# Patient Record
Sex: MALE | Race: WHITE | Employment: FULL TIME | URBAN - METROPOLITAN AREA
[De-identification: names, ages, dates, MRNs, and addresses within clinical notes are randomized per-mention and may not be internally consistent; named-entity substitution may affect disease eponyms.]

---

## 2018-12-06 ENCOUNTER — OFFICE VISIT (OUTPATIENT)
Dept: FAMILY MEDICINE CLINIC | Age: 33
End: 2018-12-06
Payer: COMMERCIAL

## 2018-12-06 VITALS
DIASTOLIC BLOOD PRESSURE: 72 MMHG | TEMPERATURE: 97.9 F | SYSTOLIC BLOOD PRESSURE: 118 MMHG | BODY MASS INDEX: 26.62 KG/M2 | HEART RATE: 86 BPM | HEIGHT: 67 IN | RESPIRATION RATE: 16 BRPM | OXYGEN SATURATION: 98 % | WEIGHT: 169.6 LBS

## 2018-12-06 DIAGNOSIS — F43.9 STRESS AT HOME: Primary | ICD-10-CM

## 2018-12-06 DIAGNOSIS — Z13.220 SCREENING, LIPID: ICD-10-CM

## 2018-12-06 DIAGNOSIS — J30.9 ALLERGIC RHINITIS, UNSPECIFIED SEASONALITY, UNSPECIFIED TRIGGER: ICD-10-CM

## 2018-12-06 DIAGNOSIS — G43.909 MIGRAINE WITHOUT STATUS MIGRAINOSUS, NOT INTRACTABLE, UNSPECIFIED MIGRAINE TYPE: ICD-10-CM

## 2018-12-06 PROCEDURE — 1036F TOBACCO NON-USER: CPT | Performed by: FAMILY MEDICINE

## 2018-12-06 PROCEDURE — G8484 FLU IMMUNIZE NO ADMIN: HCPCS | Performed by: FAMILY MEDICINE

## 2018-12-06 PROCEDURE — 99203 OFFICE O/P NEW LOW 30 MIN: CPT | Performed by: FAMILY MEDICINE

## 2018-12-06 PROCEDURE — G8419 CALC BMI OUT NRM PARAM NOF/U: HCPCS | Performed by: FAMILY MEDICINE

## 2018-12-06 PROCEDURE — G8427 DOCREV CUR MEDS BY ELIG CLIN: HCPCS | Performed by: FAMILY MEDICINE

## 2018-12-06 RX ORDER — MONTELUKAST SODIUM 10 MG/1
10 TABLET ORAL DAILY
Qty: 90 TABLET | Refills: 3 | Status: SHIPPED | OUTPATIENT
Start: 2018-12-06 | End: 2020-01-16 | Stop reason: SDUPTHER

## 2018-12-06 RX ORDER — BUPROPION HYDROCHLORIDE 150 MG/1
150 TABLET ORAL EVERY MORNING
Qty: 30 TABLET | Refills: 1 | Status: SHIPPED | OUTPATIENT
Start: 2018-12-06 | End: 2020-01-13

## 2018-12-06 RX ORDER — MONTELUKAST SODIUM 10 MG/1
10 TABLET ORAL DAILY
COMMUNITY
End: 2018-12-06 | Stop reason: SDUPTHER

## 2018-12-06 ASSESSMENT — ENCOUNTER SYMPTOMS
SORE THROAT: 0
VOICE CHANGE: 0
PHOTOPHOBIA: 0
ABDOMINAL PAIN: 0
VOMITING: 0
CONSTIPATION: 0
TROUBLE SWALLOWING: 0
WHEEZING: 0
NAUSEA: 0
COUGH: 0
SINUS PRESSURE: 0
SHORTNESS OF BREATH: 0
BACK PAIN: 0
BLOOD IN STOOL: 0
CHEST TIGHTNESS: 0
DIARRHEA: 0

## 2018-12-06 ASSESSMENT — PATIENT HEALTH QUESTIONNAIRE - PHQ9
2. FEELING DOWN, DEPRESSED OR HOPELESS: 0
SUM OF ALL RESPONSES TO PHQ9 QUESTIONS 1 & 2: 0
SUM OF ALL RESPONSES TO PHQ QUESTIONS 1-9: 0
1. LITTLE INTEREST OR PLEASURE IN DOING THINGS: 0
SUM OF ALL RESPONSES TO PHQ QUESTIONS 1-9: 0

## 2018-12-06 NOTE — PROGRESS NOTES
visit.        There are no associated abnormal neurological symptoms such as TIA's, loss of balance, loss of vision or speech, numbness or weakness on review. Past neurological history: negative for stroke, MS, epilepsy, or brain tumor. Feeling well otherwise, no complaints. Labs reviewed. Taking medications as prescribed. Patient's past medical, surgical, social and/or family history reviewed, updated in chart, and are non-contributory (unless otherwise stated). Medications and allergies also reviewed and updated in chart. Review of Systems   Constitutional: Negative for activity change, appetite change, chills, diaphoresis, fatigue, fever and unexpected weight change. HENT: Positive for congestion. Negative for hearing loss, sinus pressure, sore throat, tinnitus, trouble swallowing and voice change. Eyes: Negative for photophobia and visual disturbance. Respiratory: Negative for cough, chest tightness, shortness of breath and wheezing. Cardiovascular: Negative for chest pain, palpitations and leg swelling. Gastrointestinal: Negative for abdominal pain, blood in stool, constipation, diarrhea, nausea and vomiting. Endocrine: Negative for cold intolerance, heat intolerance, polydipsia, polyphagia and polyuria. Genitourinary: Negative for difficulty urinating, frequency, hematuria and urgency. Musculoskeletal: Negative for back pain, joint swelling and myalgias. Allergic/Immunologic: Negative for environmental allergies, food allergies and immunocompromised state. Neurological: Negative for dizziness, weakness, numbness and headaches. Hematological: Negative for adenopathy. Does not bruise/bleed easily. Psychiatric/Behavioral: Negative for agitation, behavioral problems, confusion, decreased concentration and sleep disturbance. The patient is not nervous/anxious. All other systems reviewed and are negative.       Physical Exam  Temp Readings from Last 3 Encounters:

## 2019-01-03 ENCOUNTER — HOSPITAL ENCOUNTER (OUTPATIENT)
Age: 34
Discharge: HOME | End: 2019-01-03
Payer: COMMERCIAL

## 2019-01-03 DIAGNOSIS — J30.9 ALLERGIC RHINITIS, UNSPECIFIED SEASONALITY, UNSPECIFIED TRIGGER: ICD-10-CM

## 2019-01-03 DIAGNOSIS — Z13.220 SCREENING, LIPID: ICD-10-CM

## 2019-01-03 DIAGNOSIS — G43.909 MIGRAINE WITHOUT STATUS MIGRAINOSUS, NOT INTRACTABLE, UNSPECIFIED MIGRAINE TYPE: ICD-10-CM

## 2019-01-03 LAB
ALBUMIN SERPL-MCNC: 4.8 G/DL (ref 3.5–5.2)
ALP BLD-CCNC: 46 U/L (ref 40–129)
ALT SERPL-CCNC: 20 U/L (ref 0–40)
ANION GAP SERPL CALCULATED.3IONS-SCNC: 11 MMOL/L (ref 7–16)
AST SERPL-CCNC: 16 U/L (ref 0–39)
BILIRUB SERPL-MCNC: 1.1 MG/DL (ref 0–1.2)
BUN BLDV-MCNC: 17 MG/DL (ref 6–20)
CALCIUM SERPL-MCNC: 9.6 MG/DL (ref 8.6–10.2)
CHLORIDE BLD-SCNC: 101 MMOL/L (ref 98–107)
CHOLESTEROL, TOTAL: 149 MG/DL (ref 0–199)
CO2: 29 MMOL/L (ref 22–29)
CREAT SERPL-MCNC: 1.1 MG/DL (ref 0.7–1.2)
GFR AFRICAN AMERICAN: >60
GFR NON-AFRICAN AMERICAN: >60 ML/MIN/1.73
GLUCOSE BLD-MCNC: 110 MG/DL (ref 74–99)
HCT VFR BLD CALC: 44.1 % (ref 37–54)
HDLC SERPL-MCNC: 58 MG/DL
HEMOGLOBIN: 14.8 G/DL (ref 12.5–16.5)
LDL CHOLESTEROL CALCULATED: 77 MG/DL (ref 0–99)
MCH RBC QN AUTO: 30.2 PG (ref 26–35)
MCHC RBC AUTO-ENTMCNC: 33.6 % (ref 32–34.5)
MCV RBC AUTO: 90 FL (ref 80–99.9)
PDW BLD-RTO: 12.6 FL (ref 11.5–15)
PLATELET # BLD: 213 E9/L (ref 130–450)
PMV BLD AUTO: 10.2 FL (ref 7–12)
POTASSIUM SERPL-SCNC: 4 MMOL/L (ref 3.5–5)
RBC # BLD: 4.9 E12/L (ref 3.8–5.8)
SODIUM BLD-SCNC: 141 MMOL/L (ref 132–146)
TOTAL PROTEIN: 6.9 G/DL (ref 6.4–8.3)
TRIGL SERPL-MCNC: 68 MG/DL (ref 0–149)
TSH SERPL DL<=0.05 MIU/L-ACNC: 2.29 UIU/ML (ref 0.27–4.2)
VLDLC SERPL CALC-MCNC: 14 MG/DL
WBC # BLD: 4.9 E9/L (ref 4.5–11.5)

## 2019-01-03 PROCEDURE — 36415 COLL VENOUS BLD VENIPUNCTURE: CPT

## 2019-01-03 PROCEDURE — 85027 COMPLETE CBC AUTOMATED: CPT

## 2019-01-03 PROCEDURE — 84443 ASSAY THYROID STIM HORMONE: CPT

## 2019-01-03 PROCEDURE — 80053 COMPREHEN METABOLIC PANEL: CPT

## 2019-01-03 PROCEDURE — 80061 LIPID PANEL: CPT

## 2019-01-09 ENCOUNTER — OFFICE VISIT (OUTPATIENT)
Dept: FAMILY MEDICINE CLINIC | Age: 34
End: 2019-01-09
Payer: COMMERCIAL

## 2019-01-09 VITALS
BODY MASS INDEX: 26.37 KG/M2 | SYSTOLIC BLOOD PRESSURE: 110 MMHG | DIASTOLIC BLOOD PRESSURE: 72 MMHG | OXYGEN SATURATION: 98 % | HEART RATE: 80 BPM | WEIGHT: 168 LBS | TEMPERATURE: 98.5 F | RESPIRATION RATE: 16 BRPM | HEIGHT: 67 IN

## 2019-01-09 DIAGNOSIS — G43.909 MIGRAINE WITHOUT STATUS MIGRAINOSUS, NOT INTRACTABLE, UNSPECIFIED MIGRAINE TYPE: Primary | ICD-10-CM

## 2019-01-09 PROCEDURE — G8427 DOCREV CUR MEDS BY ELIG CLIN: HCPCS | Performed by: FAMILY MEDICINE

## 2019-01-09 PROCEDURE — 99213 OFFICE O/P EST LOW 20 MIN: CPT | Performed by: FAMILY MEDICINE

## 2019-01-09 PROCEDURE — G8484 FLU IMMUNIZE NO ADMIN: HCPCS | Performed by: FAMILY MEDICINE

## 2019-01-09 PROCEDURE — 1036F TOBACCO NON-USER: CPT | Performed by: FAMILY MEDICINE

## 2019-01-09 PROCEDURE — G8419 CALC BMI OUT NRM PARAM NOF/U: HCPCS | Performed by: FAMILY MEDICINE

## 2019-01-09 RX ORDER — SUMATRIPTAN 25 MG/1
TABLET, FILM COATED ORAL
Qty: 9 TABLET | Refills: 5 | Status: SHIPPED
Start: 2019-01-09 | End: 2020-12-17

## 2019-01-09 ASSESSMENT — ENCOUNTER SYMPTOMS
SHORTNESS OF BREATH: 0
BLOOD IN STOOL: 0
SINUS PRESSURE: 0
NAUSEA: 0
COUGH: 0
SORE THROAT: 0
WHEEZING: 0
TROUBLE SWALLOWING: 0
VOICE CHANGE: 0
CHEST TIGHTNESS: 0
ABDOMINAL PAIN: 0
PHOTOPHOBIA: 0
VOMITING: 0
DIARRHEA: 0
CONSTIPATION: 0
BACK PAIN: 0

## 2019-07-09 ENCOUNTER — OFFICE VISIT (OUTPATIENT)
Dept: FAMILY MEDICINE CLINIC | Age: 34
End: 2019-07-09
Payer: COMMERCIAL

## 2019-07-09 VITALS
RESPIRATION RATE: 16 BRPM | HEIGHT: 67 IN | SYSTOLIC BLOOD PRESSURE: 122 MMHG | DIASTOLIC BLOOD PRESSURE: 80 MMHG | WEIGHT: 164 LBS | HEART RATE: 92 BPM | BODY MASS INDEX: 25.74 KG/M2 | OXYGEN SATURATION: 100 % | TEMPERATURE: 98.6 F

## 2019-07-09 DIAGNOSIS — R10.31 RIGHT GROIN PAIN: Primary | ICD-10-CM

## 2019-07-09 DIAGNOSIS — R19.7 DIARRHEA, UNSPECIFIED TYPE: ICD-10-CM

## 2019-07-09 PROCEDURE — G8419 CALC BMI OUT NRM PARAM NOF/U: HCPCS | Performed by: FAMILY MEDICINE

## 2019-07-09 PROCEDURE — 1036F TOBACCO NON-USER: CPT | Performed by: FAMILY MEDICINE

## 2019-07-09 PROCEDURE — 99214 OFFICE O/P EST MOD 30 MIN: CPT | Performed by: FAMILY MEDICINE

## 2019-07-09 PROCEDURE — G8427 DOCREV CUR MEDS BY ELIG CLIN: HCPCS | Performed by: FAMILY MEDICINE

## 2019-07-09 ASSESSMENT — PATIENT HEALTH QUESTIONNAIRE - PHQ9
1. LITTLE INTEREST OR PLEASURE IN DOING THINGS: 0
SUM OF ALL RESPONSES TO PHQ QUESTIONS 1-9: 0
2. FEELING DOWN, DEPRESSED OR HOPELESS: 0
SUM OF ALL RESPONSES TO PHQ9 QUESTIONS 1 & 2: 0
SUM OF ALL RESPONSES TO PHQ QUESTIONS 1-9: 0

## 2019-07-09 NOTE — PROGRESS NOTES
7/9/2019    Chief Complaint   Patient presents with    Hernia     loading a helium tank now has testicle pain x 6 days        HPI    Wanda Gray is a 36 y.o. patient that presents today for:    Inguinal Hernia: Patient presents for evaluation of right right inguinal hernia. Symptoms were first noted several days ago. Symptoms did not start at work. Pain is sharp. Lump is not present. Pt has no symptoms of  chronic constipation, chronic cough, difficulty urinating. Pt. does not have previous history of groin surgery. Diarrhea:    Patient is here today with complaints of diarrhea. This is a/an chronic problem and has been going on for many weeks . He is having 4-5 stools per day. Exacerbating factors include certain foods. Alleviating factors include none. Associated signs and symptoms include Gastrointestinal ROS: no abdominal pain, change in bowel habits, or black or bloody stools. Patient does not have a change in appetite. Patient is  drinking well. He does not have signs or symptoms of dehydration. Patient does not admit to recent antibiotic usage  Recent travel includes none. Patient does not have blood in stool. Patient has not had a colonoscopy. Sick contacts include none. Rupal complains of heartburn. This has been associated with diarrhea after eating. Symptoms have been present for several months. He denies dysphagia. He has not lost weight. He denies melena, hematochezia, hematemesis, and coffee ground emesis. Medical therapy in the past has included none. Patient's past medical, surgical, social and/or family history reviewed, updated in chart, and are non-contributory (unless otherwise stated). Medications and allergies also reviewed and updated in chart.      ROS Unless otherwise specified  Review of Systems - General ROS: negative for - chills, fatigue, fever, night sweats, sleep disturbance, weight gain or weight loss  Psychological ROS: negative for - anxiety, pulses 2+ bilateral.  PT/DP pulse 2+ bilat. No C/C/E    Chest: clear to auscultation, no wheezes, rales or rhonchi, symmetric air entry. Abdomen: Soft, non-tender, non-distended, positive BS in all 4 quadrants, no testicular masses, tender to palpation at inguinal ring. Extremities:Dorsalis pedis pulses palpated bilaterally, no clubbing, cyanosis, edema or erythema     SKIN: no lesions, jaundice, petechiae, pallor, cyanosis, ecchymosis    NEURO: gross motor exam normal by observation, gait normal    Mental status - alert, oriented to person, place, and time, normal mood, behavior, speech, dress, motor activity, and thought processes      Assessment/Plan  Lisa Powers was seen today for hernia. Diagnoses and all orders for this visit:    Right groin pain  -     Stan Mojica MD, General Surgery, [place]    Diarrhea, unspecified type  -     Stan Mojica MD, General Surgery, [place]          Return if symptoms worsen or fail to improve. Linette Krishnamurthy, DO    Call or go to ED immediately if symptoms worsen or persist.    Educational materials and/or home exercises printed for patient's review and were included in patient instructions on his/her After Visit Summary and given to patient at the end of visit. Counseled regarding above diagnosis, including possible risks and complications,  especially if left uncontrolled. Counseled regarding the possible side effects, risks, benefits and alternatives to treatment; patient and/or guardian verbalizes understanding, agrees, feels comfortable with and wishes to proceed with above treatment plan. Advised patient to call with any new medication issues, and read all Rx info from pharmacy to assure aware of all possible risks and side effects of medication before taking. Reviewed age and gender appropriate health screening exams and vaccinations.   Advised patient regarding importance of keeping up with recommended health maintenance and to schedule as soon as possible if overdue, as this is important in assessing for undiagnosed pathology, especially cancer, as well as protecting against potentially harmful/life threatening disease. Patient and/or guardian verbalizes understanding and agrees with above counseling, assessment and plan. All questions answered.

## 2019-08-31 ENCOUNTER — OFFICE VISIT (OUTPATIENT)
Dept: FAMILY MEDICINE CLINIC | Age: 34
End: 2019-08-31
Payer: COMMERCIAL

## 2019-08-31 VITALS
DIASTOLIC BLOOD PRESSURE: 70 MMHG | SYSTOLIC BLOOD PRESSURE: 112 MMHG | WEIGHT: 164 LBS | HEART RATE: 84 BPM | TEMPERATURE: 98 F | BODY MASS INDEX: 25.69 KG/M2 | OXYGEN SATURATION: 99 %

## 2019-08-31 DIAGNOSIS — H10.31 ACUTE CONJUNCTIVITIS OF RIGHT EYE, UNSPECIFIED ACUTE CONJUNCTIVITIS TYPE: Primary | ICD-10-CM

## 2019-08-31 PROCEDURE — 99213 OFFICE O/P EST LOW 20 MIN: CPT | Performed by: FAMILY MEDICINE

## 2019-08-31 RX ORDER — TOBRAMYCIN 3 MG/ML
1 SOLUTION/ DROPS OPHTHALMIC EVERY 6 HOURS
Qty: 5 ML | Refills: 0 | Status: SHIPPED | OUTPATIENT
Start: 2019-08-31 | End: 2019-09-05

## 2019-08-31 ASSESSMENT — ENCOUNTER SYMPTOMS
DIARRHEA: 0
EYE REDNESS: 1
EYE ITCHING: 0
CHEST TIGHTNESS: 0
BLOOD IN STOOL: 0
ALLERGIC/IMMUNOLOGIC NEGATIVE: 1
NAUSEA: 0
PHOTOPHOBIA: 0
COUGH: 0
ABDOMINAL PAIN: 0
SHORTNESS OF BREATH: 0
VOMITING: 0
SORE THROAT: 0
EYE DISCHARGE: 0
BACK PAIN: 0
SINUS PAIN: 0
TROUBLE SWALLOWING: 0
EYE PAIN: 1

## 2019-08-31 NOTE — PROGRESS NOTES
8/31/2019), Disp: 30 tablet, Rfl: 1    montelukast (SINGULAIR) 10 MG tablet, Take 1 tablet by mouth daily, Disp: 90 tablet, Rfl: 3  Allergies   Allergen Reactions    Cephalosporins     Morphine      Violent         Past Medical History:   Diagnosis Date    Allergic rhinitis     Lactose intolerance      Past Surgical History:   Procedure Laterality Date    ANTERIOR CRUCIATE LIGAMENT REPAIR      ECHOCARDIOGRAM EXERCISE STRESS TEST  11/6/2013         TONSILLECTOMY       Family History   Problem Relation Age of Onset    Diabetes Mother     Other Father         pituitary tumor    Ovarian Cancer Maternal Grandmother     Cancer Maternal Grandfather     Lung Cancer Paternal Grandfather      Social History     Socioeconomic History    Marital status:      Spouse name: Not on file    Number of children: Not on file    Years of education: Not on file    Highest education level: Not on file   Occupational History    Occupation: Wastewater treatment   Social Needs    Financial resource strain: Not on file    Food insecurity:     Worry: Not on file     Inability: Not on file    Transportation needs:     Medical: Not on file     Non-medical: Not on file   Tobacco Use    Smoking status: Never Smoker    Smokeless tobacco: Never Used   Substance and Sexual Activity    Alcohol use:  Yes     Alcohol/week: 4.0 standard drinks     Types: 4 Cans of beer per week     Comment: daily    Drug use: No    Sexual activity: Yes     Partners: Female   Lifestyle    Physical activity:     Days per week: Not on file     Minutes per session: Not on file    Stress: Not on file   Relationships    Social connections:     Talks on phone: Not on file     Gets together: Not on file     Attends Restoration service: Not on file     Active member of club or organization: Not on file     Attends meetings of clubs or organizations: Not on file     Relationship status: Not on file    Intimate partner violence:     Fear of current or ex partner: Not on file     Emotionally abused: Not on file     Physically abused: Not on file     Forced sexual activity: Not on file   Other Topics Concern    Not on file   Social History Narrative    Not on file       Vitals:    08/31/19 0809   BP: 112/70   Pulse: 84   Temp: 98 °F (36.7 °C)   SpO2: 99%   Weight: 164 lb (74.4 kg)       Physical Exam   Constitutional: He is oriented to person, place, and time. He appears well-developed and well-nourished. HENT:   Head: Atraumatic. Right Ear: External ear normal.   Left Ear: External ear normal.   Nose: Nose normal.   Mouth/Throat: Oropharynx is clear and moist. No oropharyngeal exudate. Eyes: Pupils are equal, round, and reactive to light. EOM and lids are normal. Lids are everted and swept, no foreign bodies found. Right conjunctiva is injected. Neck: Normal range of motion. Neck supple. No tracheal deviation present. No thyromegaly present. Cardiovascular: Normal rate, regular rhythm and intact distal pulses. Exam reveals no gallop and no friction rub. No murmur heard. Pulmonary/Chest: Effort normal and breath sounds normal. No respiratory distress. Abdominal: Soft. Bowel sounds are normal.   Musculoskeletal: Normal range of motion. He exhibits no edema, tenderness or deformity. Lymphadenopathy:     He has no cervical adenopathy. Neurological: He is alert and oriented to person, place, and time. He displays normal reflexes. No sensory deficit. He exhibits normal muscle tone. Coordination normal.   Skin: Skin is warm and dry. Capillary refill takes less than 2 seconds. No rash noted. Psychiatric: He has a normal mood and affect. Assessment and Plan:  Basilia Montalvo was seen today for eye pain.     Diagnoses and all orders for this visit:    Acute conjunctivitis of right eye, unspecified acute conjunctivitis type  -     tobramycin (TOBREX) 0.3 % ophthalmic solution; Place 1 drop into the left eye every 6 hours for 5 days    Fluorescein,

## 2019-12-30 ENCOUNTER — PATIENT MESSAGE (OUTPATIENT)
Dept: FAMILY MEDICINE CLINIC | Age: 34
End: 2019-12-30

## 2019-12-30 NOTE — TELEPHONE ENCOUNTER
From: Shea Alfaro  To: Celina Rachel DO  Sent: 12/30/2019 2:33 PM EST  Subject: Non-Urgent Jewel Wheeler,    I have an appointment coming up in January and I was wondering if you wanted blood work before?      Thanks  [facility]

## 2020-01-09 ENCOUNTER — HOSPITAL ENCOUNTER (OUTPATIENT)
Age: 35
Discharge: HOME | End: 2020-01-09
Payer: COMMERCIAL

## 2020-01-09 LAB
ALBUMIN SERPL-MCNC: 4.9 G/DL (ref 3.5–5.2)
ALP BLD-CCNC: 52 U/L (ref 40–129)
ALT SERPL-CCNC: 31 U/L (ref 0–40)
ANION GAP SERPL CALCULATED.3IONS-SCNC: 11 MMOL/L (ref 7–16)
AST SERPL-CCNC: 19 U/L (ref 0–39)
BILIRUB SERPL-MCNC: 1.4 MG/DL (ref 0–1.2)
BUN BLDV-MCNC: 13 MG/DL (ref 6–20)
CALCIUM SERPL-MCNC: 9.9 MG/DL (ref 8.6–10.2)
CHLORIDE BLD-SCNC: 97 MMOL/L (ref 98–107)
CHOLESTEROL, TOTAL: 149 MG/DL (ref 0–199)
CO2: 30 MMOL/L (ref 22–29)
CREAT SERPL-MCNC: 1.1 MG/DL (ref 0.7–1.2)
GFR AFRICAN AMERICAN: >60
GFR NON-AFRICAN AMERICAN: >60 ML/MIN/1.73
GLUCOSE BLD-MCNC: 108 MG/DL (ref 74–99)
HCT VFR BLD CALC: 46.8 % (ref 37–54)
HDLC SERPL-MCNC: 49 MG/DL
HEMOGLOBIN: 15.5 G/DL (ref 12.5–16.5)
LDL CHOLESTEROL CALCULATED: 79 MG/DL (ref 0–99)
MCH RBC QN AUTO: 29.9 PG (ref 26–35)
MCHC RBC AUTO-ENTMCNC: 33.1 % (ref 32–34.5)
MCV RBC AUTO: 90.3 FL (ref 80–99.9)
PDW BLD-RTO: 11.9 FL (ref 11.5–15)
PLATELET # BLD: 235 E9/L (ref 130–450)
PMV BLD AUTO: 9.8 FL (ref 7–12)
POTASSIUM SERPL-SCNC: 4.3 MMOL/L (ref 3.5–5)
RBC # BLD: 5.18 E12/L (ref 3.8–5.8)
SODIUM BLD-SCNC: 138 MMOL/L (ref 132–146)
TOTAL PROTEIN: 7.5 G/DL (ref 6.4–8.3)
TRIGL SERPL-MCNC: 104 MG/DL (ref 0–149)
TSH SERPL DL<=0.05 MIU/L-ACNC: 2.89 UIU/ML (ref 0.27–4.2)
VLDLC SERPL CALC-MCNC: 21 MG/DL
WBC # BLD: 7.1 E9/L (ref 4.5–11.5)

## 2020-01-09 PROCEDURE — 36415 COLL VENOUS BLD VENIPUNCTURE: CPT

## 2020-01-09 PROCEDURE — 80053 COMPREHEN METABOLIC PANEL: CPT

## 2020-01-09 PROCEDURE — 84443 ASSAY THYROID STIM HORMONE: CPT

## 2020-01-09 PROCEDURE — 80061 LIPID PANEL: CPT

## 2020-01-09 PROCEDURE — 85027 COMPLETE CBC AUTOMATED: CPT

## 2020-01-13 ENCOUNTER — OFFICE VISIT (OUTPATIENT)
Dept: FAMILY MEDICINE CLINIC | Age: 35
End: 2020-01-13
Payer: COMMERCIAL

## 2020-01-13 VITALS
WEIGHT: 177.4 LBS | SYSTOLIC BLOOD PRESSURE: 126 MMHG | HEART RATE: 77 BPM | DIASTOLIC BLOOD PRESSURE: 82 MMHG | TEMPERATURE: 98 F | BODY MASS INDEX: 27.78 KG/M2 | RESPIRATION RATE: 16 BRPM | OXYGEN SATURATION: 99 %

## 2020-01-13 LAB — HBA1C MFR BLD: 5.2 %

## 2020-01-13 PROCEDURE — 1036F TOBACCO NON-USER: CPT | Performed by: FAMILY MEDICINE

## 2020-01-13 PROCEDURE — G8484 FLU IMMUNIZE NO ADMIN: HCPCS | Performed by: FAMILY MEDICINE

## 2020-01-13 PROCEDURE — 99213 OFFICE O/P EST LOW 20 MIN: CPT | Performed by: FAMILY MEDICINE

## 2020-01-13 PROCEDURE — G8419 CALC BMI OUT NRM PARAM NOF/U: HCPCS | Performed by: FAMILY MEDICINE

## 2020-01-13 PROCEDURE — G8427 DOCREV CUR MEDS BY ELIG CLIN: HCPCS | Performed by: FAMILY MEDICINE

## 2020-01-13 PROCEDURE — 83036 HEMOGLOBIN GLYCOSYLATED A1C: CPT | Performed by: FAMILY MEDICINE

## 2020-01-13 ASSESSMENT — PATIENT HEALTH QUESTIONNAIRE - PHQ9
SUM OF ALL RESPONSES TO PHQ QUESTIONS 1-9: 0
SUM OF ALL RESPONSES TO PHQ9 QUESTIONS 1 & 2: 0
SUM OF ALL RESPONSES TO PHQ QUESTIONS 1-9: 0
1. LITTLE INTEREST OR PLEASURE IN DOING THINGS: 0
2. FEELING DOWN, DEPRESSED OR HOPELESS: 0

## 2020-01-16 RX ORDER — MONTELUKAST SODIUM 10 MG/1
10 TABLET ORAL DAILY
Qty: 90 TABLET | Refills: 3 | Status: SHIPPED
Start: 2020-01-16 | End: 2021-01-18 | Stop reason: SDUPTHER

## 2020-06-15 ENCOUNTER — OFFICE VISIT (OUTPATIENT)
Dept: FAMILY MEDICINE CLINIC | Age: 35
End: 2020-06-15
Payer: COMMERCIAL

## 2020-06-15 VITALS
SYSTOLIC BLOOD PRESSURE: 124 MMHG | WEIGHT: 178 LBS | BODY MASS INDEX: 27.94 KG/M2 | RESPIRATION RATE: 16 BRPM | HEART RATE: 81 BPM | TEMPERATURE: 98.6 F | OXYGEN SATURATION: 98 % | DIASTOLIC BLOOD PRESSURE: 84 MMHG | HEIGHT: 67 IN

## 2020-06-15 PROCEDURE — 1036F TOBACCO NON-USER: CPT | Performed by: FAMILY MEDICINE

## 2020-06-15 PROCEDURE — 99214 OFFICE O/P EST MOD 30 MIN: CPT | Performed by: FAMILY MEDICINE

## 2020-06-15 PROCEDURE — G8427 DOCREV CUR MEDS BY ELIG CLIN: HCPCS | Performed by: FAMILY MEDICINE

## 2020-06-15 PROCEDURE — G8419 CALC BMI OUT NRM PARAM NOF/U: HCPCS | Performed by: FAMILY MEDICINE

## 2020-06-15 RX ORDER — CYCLOBENZAPRINE HCL 5 MG
5 TABLET ORAL NIGHTLY PRN
Qty: 30 TABLET | Refills: 0 | Status: SHIPPED
Start: 2020-06-15 | End: 2020-12-17

## 2020-06-15 NOTE — PROGRESS NOTES
6/16/2020    Chief Complaint   Patient presents with    Hip Pain     right hip pain x 3 months no injury    Skin Problem     left calf gets a hot burning sensation but not hot to touch x 1 week       HPI    Danie Tracey is a 30 y.o. patient that presents today for low back pain. Hip Pain: Patient complains of right hip pain. Onset of the symptoms was several months ago. Inciting event: none. Current symptoms include is worse with weight bearing. Associated symptoms: none. Aggravating symptoms: any weight bearing. Patient's overall course: stable. Patient has had no prior hip problems. Previous visits for this problem: none. Evaluation to date: none. Treatment to date: none. Has not taken any otc meds. Sleep Apnea: Patient presents with possible obstructive sleep apnea. Patent has a several year history of symptoms of daytime fatigue. Patient generally gets 5 or 6 hours of sleep per night, and states they generally have generally restful sleep. Snoring of moderate severity is present. Apneic episodes is present. Nasal obstruction is not present. Patient has had tonsillectomy due to MONALISA. Diagnosed as a child. Does have a CPAP. Patient's past medical, surgical, social and/or family history reviewed, updated in chart, and are non-contributory (unless otherwise stated). Medications and allergies also reviewed and updated in chart.      ROS: Unless otherwise noted  Review of Systems - General ROS: negative for - chills, fatigue, fever, night sweats, sleep disturbance, weight gain or weight loss  Psychological ROS: negative for - anxiety, behavioral disorder, depression, hallucinations, irritability, memory difficulties, mood swings, sleep disturbances or suicidal ideation  ENT ROS: negative for - epistaxis, headaches, hearing change, nasal congestion, nasal discharge, nasal polyps, sinus pain, tinnitus, vertigo or visual changes  Hematological and Lymphatic ROS: negative for - bleeding obvious masses or deformities. Gait normal. LROM in extension/SLR bilaterally. Abdomen: Soft, non-tender, non-distended, positive BS in all 4 quadrants    Extremities:Dorsalis pedis pulses palpated bilaterally, no clubbing, cyanosis, edema or erythema     SKIN: no lesions, jaundice, petechiae, pallor, cyanosis, ecchymosis    NEURO: gross motor exam normal by observation, gait normal      Assessment/Plan  Jorje Rodriguez was seen today for hip pain and skin problem. Diagnoses and all orders for this visit:    Right hip pain  -     XR HIP RIGHT (2-3 VIEWS); Future  -     cyclobenzaprine (FLEXERIL) 5 MG tablet; Take 1 tablet by mouth nightly as needed for Muscle spasms    Obstructive sleep apnea syndrome  -     Baseline Diagnostic Sleep Study; Future    Lumbar radiculopathy  -     XR LUMBAR SPINE (MIN 4 VIEWS); Future          Return if symptoms worsen or fail to improve. Linette Krishnamurthy, DO    Call or go to ED immediately if symptoms worsen or persist.    Educational materials and/or home exercises printed for patient's review and were included in patient instructions on his/her After Visit Summary and given to patient at the end of visit. Counseled regarding above diagnosis, including possible risks and complications,  especially if left uncontrolled. Counseled regarding the possible side effects, risks, benefits and alternatives to treatment; patient and/or guardian verbalizes understanding, agrees, feels comfortable with and wishes to proceed with above treatment plan. Advised patient to call with any new medication issues, and read all Rx info from pharmacy to assure aware of all possible risks and side effects of medication before taking. Reviewed age and gender appropriate health screening exams and vaccinations.   Advised patient regarding importance of keeping up with recommended health maintenance and to schedule as soon as possible if overdue, as this is important in assessing for

## 2020-06-15 NOTE — PATIENT INSTRUCTIONS
Patient Education        Hip Bursitis: Exercises  Introduction  Here are some examples of exercises for you to try. The exercises may be suggested for a condition or for rehabilitation. Start each exercise slowly. Ease off the exercises if you start to have pain. You will be told when to start these exercises and which ones will work best for you. How to do the exercises  Hip rotator stretch   1. Lie on your back with both knees bent and your feet flat on the floor. 2. Put the ankle of your affected leg on your opposite thigh near your knee. 3. Use your hand to gently push your knee away from your body until you feel a gentle stretch around your hip. 4. Hold the stretch for 15 to 30 seconds. 5. Repeat 2 to 4 times. 6. Repeat steps 1 through 5, but this time use your hand to gently pull your knee toward your opposite shoulder. Iliotibial band stretch   1. Lean sideways against a wall. If you are not steady on your feet, hold on to a chair or counter. 2. Stand on the leg with the affected hip, with that leg close to the wall. Then cross your other leg in front of it. 3. Let your affected hip drop out to the side of your body and against wall. Then lean away from your affected hip until you feel a stretch. 4. Hold the stretch for 15 to 30 seconds. 5. Repeat 2 to 4 times. Straight-leg raises to the outside   1. Lie on your side, with your affected hip on top. 2. Tighten the front thigh muscles of your top leg to keep your knee straight. 3. Keep your hip and your leg straight in line with the rest of your body, and keep your knee pointing forward. Do not drop your hip back. 4. Lift your top leg straight up toward the ceiling, about 12 inches off the floor. Hold for about 6 seconds, then slowly lower your leg. 5. Repeat 8 to 12 times. Clamshell   1. Lie on your side, with your affected hip on top and your head propped on a pillow. Keep your feet and knees together and your knees bent.   2. Raise your top knee, but keep your feet together. Do not let your hips roll back. Your legs should open up like a clamshell. 3. Hold for 6 seconds. 4. Slowly lower your knee back down. Rest for 10 seconds. 5. Repeat 8 to 12 times. Follow-up care is a key part of your treatment and safety. Be sure to make and go to all appointments, and call your doctor if you are having problems. It's also a good idea to know your test results and keep a list of the medicines you take. Where can you learn more? Go to https://SnaptalentpeAudioPixels.[employer]. org and sign in to your [employer] account. Enter Y119 in the [facility] box to learn more about \"Hip Bursitis: Exercises. \"     If you do not have an account, please click on the \"Sign Up Now\" link. Current as of: March 2, 2020               Content Version: 12.5  © 2006-2020 [employer]. Care instructions adapted under license by [facility] ([facility]). If you have questions about a medical condition or this instruction, always ask your healthcare professional. [place] [address] any warranty or liability for your use of this information. Patient Education        Low Back Pain: Exercises  Introduction  Here are some examples of exercises for you to try. The exercises may be suggested for a condition or for rehabilitation. Start each exercise slowly. Ease off the exercises if you start to have pain. You will be told when to start these exercises and which ones will work best for you. How to do the exercises  Press-up   7. Lie on your stomach, supporting your body with your forearms. 8. Press your elbows down into the floor to raise your upper back. As you do this, relax your stomach muscles and allow your back to arch without using your back muscles. As your press up, do not let your hips or pelvis come off the floor. 9. Hold for 15 to 30 seconds, then relax. 10. Repeat 2 to 4 times.     Alternate arm and leg (bird dog) exercise   Do tighten your muscles by pulling in and imagining your belly button moving toward your spine. You should feel like your back is pressing to the floor and your hips and pelvis are rocking back. 3. Hold for about 6 seconds while you breathe smoothly. 4. Repeat 8 to 12 times. Heel dig bridging   1. Lie on your back with both knees bent and your ankles bent so that only your heels are digging into the floor. Your knees should be bent about 90 degrees. 2. Then push your heels into the floor, squeeze your buttocks, and lift your hips off the floor until your shoulders, hips, and knees are all in a straight line. 3. Hold for about 6 seconds as you continue to breathe normally, and then slowly lower your hips back down to the floor and rest for up to 10 seconds. 4. Do 8 to 12 repetitions. Hamstring stretch in doorway   1. Lie on your back in a doorway, with one leg through the open door. 2. Slide your leg up the wall to straighten your knee. You should feel a gentle stretch down the back of your leg. 3. Hold the stretch for at least 15 to 30 seconds. Do not arch your back, point your toes, or bend either knee. Keep one heel touching the floor and the other heel touching the wall. 4. Repeat with your other leg. 5. Do 2 to 4 times for each leg. Hip flexor stretch   1. Kneel on the floor with one knee bent and one leg behind you. Place your forward knee over your foot. Keep your other knee touching the floor. 2. Slowly push your hips forward until you feel a stretch in the upper thigh of your rear leg. 3. Hold the stretch for at least 15 to 30 seconds. Repeat with your other leg. 4. Do 2 to 4 times on each side. Wall sit   1. Stand with your back 10 to 12 inches away from a wall. 2. Lean into the wall until your back is flat against it. 3. Slowly slide down until your knees are slightly bent, pressing your lower back into the wall. 4. Hold for about 6 seconds, then slide back up the wall.   5. Repeat 8 to 12 times. Follow-up care is a key part of your treatment and safety. Be sure to make and go to all appointments, and call your doctor if you are having problems. It's also a good idea to know your test results and keep a list of the medicines you take. Where can you learn more? Go to https://chpepiceweb.[employer]. org and sign in to your [employer] account. Enter O000 in the [employer] box to learn more about \"Low Back Pain: Exercises. \"     If you do not have an account, please click on the \"Sign Up Now\" link. Current as of: March 2, 2020               Content Version: 12.5  © 2006-2020 [employer]. Care instructions adapted under license by [facility] ([facility]). If you have questions about a medical condition or this instruction, always ask your healthcare professional. [address] any warranty or liability for your use of this information.

## 2020-07-23 ENCOUNTER — OFFICE VISIT (OUTPATIENT)
Dept: FAMILY MEDICINE CLINIC | Age: 35
End: 2020-07-23
Payer: COMMERCIAL

## 2020-07-23 VITALS
SYSTOLIC BLOOD PRESSURE: 118 MMHG | WEIGHT: 178 LBS | HEIGHT: 67 IN | OXYGEN SATURATION: 96 % | RESPIRATION RATE: 18 BRPM | BODY MASS INDEX: 27.94 KG/M2 | DIASTOLIC BLOOD PRESSURE: 80 MMHG | TEMPERATURE: 98 F | HEART RATE: 81 BPM

## 2020-07-23 PROCEDURE — G8419 CALC BMI OUT NRM PARAM NOF/U: HCPCS | Performed by: FAMILY MEDICINE

## 2020-07-23 PROCEDURE — 99213 OFFICE O/P EST LOW 20 MIN: CPT | Performed by: FAMILY MEDICINE

## 2020-07-23 PROCEDURE — 1036F TOBACCO NON-USER: CPT | Performed by: FAMILY MEDICINE

## 2020-07-23 PROCEDURE — G8427 DOCREV CUR MEDS BY ELIG CLIN: HCPCS | Performed by: FAMILY MEDICINE

## 2020-07-23 SDOH — ECONOMIC STABILITY: INCOME INSECURITY: HOW HARD IS IT FOR YOU TO PAY FOR THE VERY BASICS LIKE FOOD, HOUSING, MEDICAL CARE, AND HEATING?: NOT HARD AT ALL

## 2020-07-23 SDOH — ECONOMIC STABILITY: FOOD INSECURITY: WITHIN THE PAST 12 MONTHS, YOU WORRIED THAT YOUR FOOD WOULD RUN OUT BEFORE YOU GOT MONEY TO BUY MORE.: NEVER TRUE

## 2020-07-23 SDOH — ECONOMIC STABILITY: TRANSPORTATION INSECURITY
IN THE PAST 12 MONTHS, HAS LACK OF TRANSPORTATION KEPT YOU FROM MEETINGS, WORK, OR FROM GETTING THINGS NEEDED FOR DAILY LIVING?: NO

## 2020-07-23 SDOH — ECONOMIC STABILITY: TRANSPORTATION INSECURITY
IN THE PAST 12 MONTHS, HAS THE LACK OF TRANSPORTATION KEPT YOU FROM MEDICAL APPOINTMENTS OR FROM GETTING MEDICATIONS?: NO

## 2020-07-23 SDOH — ECONOMIC STABILITY: FOOD INSECURITY: WITHIN THE PAST 12 MONTHS, THE FOOD YOU BOUGHT JUST DIDN'T LAST AND YOU DIDN'T HAVE MONEY TO GET MORE.: NEVER TRUE

## 2020-07-23 NOTE — PROGRESS NOTES
7/23/2020    Chief Complaint   Patient presents with    Hip Pain     follow up right hip pain review x=rays       HPI    Frederica Hashimoto is a 30 y.o. patient that presents today for:      Impression    Mild joint space narrowing of the right hip.  No acute osseous abnormality. Patient's past medical, surgical, social and/or family history reviewed, updated in chart, and are non-contributory (unless otherwise stated). Medications and allergies also reviewed and updated in chart.      ROS Unless otherwise specified  Review of Systems - General ROS: negative for - chills, fatigue, fever, night sweats, sleep disturbance, weight gain or weight loss  Psychological ROS: negative for - anxiety, behavioral disorder, depression, hallucinations, irritability, memory difficulties, mood swings, sleep disturbances or suicidal ideation  ENT ROS: negative for - epistaxis, headaches, hearing change, nasal congestion, nasal discharge, nasal polyps, sinus pain, tinnitus, vertigo or visual changes  Hematological and Lymphatic ROS: negative for - bleeding problems, blood clots, fatigue or swollen lymph nodes  Respiratory ROS: negative for - cough, orthopnea, shortness of breath, sputum changes, tachypnea or wheezing  Cardiovascular ROS: negative for - chest pain, dyspnea on exertion, irregular heartbeat, loss of consciousness, palpitations, paroxysmal nocturnal dyspnea or rapid heart rate  Gastrointestinal ROS: negative for - abdominal pain, blood in stools, change in bowel habits, constipation, diarrhea, gas/bloating, heartburn or nausea/vomiting  Musculoskeletal ROS: negative for - joint pain, joint stiffness, joint swelling or muscle, back pain, bowel or bladder incontinence  Neurological ROS: negative for - behavioral changes, confusion, dizziness, headaches, memory loss, numbness/tingling, seizures or speech problems, weakness  Dermatological ROS: negative for - dry skin, mole changes, nail changes, pruritus, rash or skin lesion changes    Physical Exam  Temp Readings from Last 3 Encounters:   07/23/20 98 °F (36.7 °C)   06/15/20 98.6 °F (37 °C)   01/13/20 98 °F (36.7 °C)     Wt Readings from Last 3 Encounters:   07/23/20 178 lb (80.7 kg)   06/15/20 178 lb (80.7 kg)   01/13/20 177 lb 6.4 oz (80.5 kg)     BP Readings from Last 3 Encounters:   07/23/20 118/80   06/15/20 124/84   01/13/20 126/82     Pulse Readings from Last 3 Encounters:   07/23/20 81   06/15/20 81   01/13/20 77       General appearance: alert, well appearing, and in no distress, oriented to person, place, and time and normal appearing weight. CVS exam: normal rate, regular rhythm, normal S1, S2, no murmurs, rubs, clicks or gallops. Radial pulses 2+ bilateral.  PT/DP pulse 2+ bilat. No C/C/E    Chest: clear to auscultation, no wheezes, rales or rhonchi, symmetric air entry. Abdomen: Soft, non-tender, non-distended, positive BS in all 4 quadrants    Extremities:Dorsalis pedis pulses palpated bilaterally, no clubbing, cyanosis, edema or erythema,     SKIN: no lesions, jaundice, petechiae, pallor, cyanosis, ecchymosis    NEURO: gross motor exam normal by observation, gait normal    Mental status - alert, oriented to person, place, and time, normal mood, behavior, speech, dress, motor activity, and thought processes      Assessment/Plan  Elsi Reina was seen today for hip pain. Diagnoses and all orders for this visit:    Right hip pain  -     Kandi Ling DO , [facility] [place]    Lumbar radiculopathy  -     [address] [place] [address] Kandi Olmos DO , [facility] [place]          No follow-ups on file. Linette Krishnamurthy DO    Call or go to ED immediately if symptoms worsen or persist.    Educational materials and/or home exercises printed for patient's review and were included in patient instructions on his/her After Visit Summary and given to patient at the end of visit.        Counseled regarding above diagnosis, including possible risks and complications,  especially if left uncontrolled. Counseled regarding the possible side effects, risks, benefits and alternatives to treatment; patient and/or guardian verbalizes understanding, agrees, feels comfortable with and wishes to proceed with above treatment plan. Advised patient to call with any new medication issues, and read all Rx info from pharmacy to assure aware of all possible risks and side effects of medication before taking. Reviewed age and gender appropriate health screening exams and vaccinations. Advised patient regarding importance of keeping up with recommended health maintenance and to schedule as soon as possible if overdue, as this is important in assessing for undiagnosed pathology, especially cancer, as well as protecting against potentially harmful/life threatening disease. Patient and/or guardian verbalizes understanding and agrees with above counseling, assessment and plan. All questions answered.

## 2020-08-21 DIAGNOSIS — G47.33 OBSTRUCTIVE SLEEP APNEA (ADULT) (PEDIATRIC): Primary | ICD-10-CM

## 2020-09-16 ENCOUNTER — OFFICE VISIT (OUTPATIENT)
Dept: PHYSICAL MEDICINE AND REHAB | Age: 35
End: 2020-09-16
Payer: COMMERCIAL

## 2020-09-16 VITALS
BODY MASS INDEX: 27.94 KG/M2 | HEART RATE: 89 BPM | TEMPERATURE: 97.6 F | OXYGEN SATURATION: 98 % | HEIGHT: 67 IN | SYSTOLIC BLOOD PRESSURE: 114 MMHG | WEIGHT: 178 LBS | DIASTOLIC BLOOD PRESSURE: 68 MMHG

## 2020-09-16 PROCEDURE — G8427 DOCREV CUR MEDS BY ELIG CLIN: HCPCS | Performed by: PHYSICAL MEDICINE & REHABILITATION

## 2020-09-16 PROCEDURE — 99204 OFFICE O/P NEW MOD 45 MIN: CPT | Performed by: PHYSICAL MEDICINE & REHABILITATION

## 2020-09-16 PROCEDURE — G8419 CALC BMI OUT NRM PARAM NOF/U: HCPCS | Performed by: PHYSICAL MEDICINE & REHABILITATION

## 2020-09-16 NOTE — PROGRESS NOTES
[facility]  [address] [facility], [facility]  Phone: [phone]  Fax: [phone]    Consultation for Morteza Morgan  : 1985  MRN: [id number]  PCP: Cecilia Patterson DO  REF: Ny Cheng DO  Date of visit: 20    Chief Complaint   Patient presents with    Lower Back Pain     right sided low back pain - radiates down his leg - hip pain    Shoulder Pain     right shoulder pain radiated up to his neck - pain in shoulder is only when he lifts his arm     Dear Dr. Karey Epley,    Thank you for your referral of Morteza Morgan to the Department of PM&R for evaluation of bilateral low back pain, right hip pain, right shoulder pain. As you know, this is a 30 y.o. male with pertinent past medical history of lactose intolerance, allergic rhinitis. HPI:   Patient presents as new patient evaluation regarding multiple pain generators that have been present for several years. 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 Pain began several years ago without major inciting event. Location: bilateral low back, right lateral hip, right shoulder pain  Quality: sharp pain  Severity: 6/10. Pain Alleviated by new boots, rest.  Hip aggravated by sitting or standing. Low back aggravated by laying on stomach. Shoulder aggravated by laying on it. Timing: constant  Sensation: No numbness or tingling. Radiating down lateral right thigh. PRIOR INJURIES/TREATMENT:  Ice/Heat: Yes, modest relief  Brace: No  Medications:    Currently: Ibuprofen PRN   Past: Flexeril 5 mg  Physical Therapy: Therapy last tried in  - some relief  Chiropractic: Last tried in  - some relief  Injection: R shoulder   Prior Surgery in location of pain: No, L ACL reconstruction  Prior Fracture/Injury in location of pain: No     Falls: No     No new N/T, weakness. No new B/B changes.      The prior workup has included: Xray.     Diagnostic Studies:  - XR lumbar spine from [facility] dated 7/2/2020 (reviewed personally on 9/17/2020) demonstrates:   Impression    Minimal retrolisthesis of L4 on L5.      - XR right hip from [facility] dated 7/2/2020 (reviewed personally on 9/17/2020) demonstrates:    Impression    Mild joint space narrowing of the right hip.  No acute osseous abnormality. Allergies   Allergen Reactions    Cephalosporins     Morphine      Violent         Current Outpatient Medications   Medication Sig Dispense Refill    ELDERBERRY PO Take by mouth      diclofenac sodium (VOLTAREN) 1 % GEL Apply 4 g topically 4 times daily 2 Tube 5    montelukast (SINGULAIR) 10 MG tablet Take 1 tablet by mouth daily 90 tablet 3    cyclobenzaprine (FLEXERIL) 5 MG tablet Take 1 tablet by mouth nightly as needed for Muscle spasms 30 tablet 0    SUMAtriptan (IMITREX) 25 MG tablet 1 tab PO at onset of headache. May repeat x1 dose in 2 hours if HA persists. Max of 2 pills per day PRN (Patient not taking: Reported on 8/31/2019) 9 tablet 5     No current facility-administered medications for this visit. Past Medical History:   Diagnosis Date    Allergic rhinitis     Lactose intolerance        Past Surgical History:   Procedure Laterality Date    ANTERIOR CRUCIATE LIGAMENT REPAIR      ECHOCARDIOGRAM EXERCISE STRESS TEST  11/6/2013         TONSILLECTOMY         Family History   Problem Relation Age of Onset    Diabetes Mother     Other Father         pituitary tumor    Ovarian Cancer Maternal Grandmother     Cancer Maternal Grandfather     Lung Cancer Paternal Grandfather        Social History     Tobacco Use    Smoking status: Never Smoker    Smokeless tobacco: Never Used   Substance Use Topics    Alcohol use: Yes     Alcohol/week: 4.0 standard drinks     Types: 4 Cans of beer per week     Comment: daily    Drug use: No        Functional Status:    The patient is able to ambulate and perform activities of daily living without the use of an assistive device. Occupation: The patient is currently employed in a waste water treatment. ROS:    Constitutional: Denies fevers, chills, unintentional weight loss     Skin: Denies rash or skin changes     Eyes: Denies vision changes    Ears/Nose/Throat: Denies nasal congestion or sore throat     Respiratory: Denies SOB or cough     Cardiovascular: Denies CP, palpitations, edema      Gastrointestinal: Denies abdominal pain, N/V, constipation, or diarrhea    Genitourinary: Denies urinary symptoms    Neurologic: See HPI.     MSK: See HPI. Psychiatric: Denies sleep disturbance, anxiety, depression    Hematologic/Lymphatic/Immunologic: Denies bruising     Physical Exam:   Blood pressure 114/68, pulse 89, temperature 97.6 °F (36.4 °C), temperature source Infrared, height 5' 7\" (1.702 m), weight 178 lb (80.7 kg), SpO2 98 %. PAIN: 6/10  GEN APPEARANCE: Pleasant, well developed, well nourished in no acute distress; Alert and Oriented; body habitus is athletic  PSYCH: Normal mood and affect   HEAD: Normocephalic; no facial asymetry noted  EYES: Pupils equal and reactive; EOMI  RESP: Breathing non-labored, no cyanosis  CARDIO: No pitting edema in bilateral lower extremities   SKIN: No lesions grossly visible on low back    MSK:    Lumbar/Hip Exam:      Inspection:  The [place] crests were symmetrical.   Lumbar lordotic curvature was decreased  There was no evident scoliotic curve   There was no ecchymosis or erythema    Palpation:  Tenderness over sacral spine area: No  Tenderness at the SI joint: No  Tenderness at the PSIS: No  Tenderness over the Gluteal area: Yes, right  Greater Trochanter Pain: Yes, concordant, right  Spinous process Pain: No.      There was a mild generalized tenderness to the lumbar paraspinal region bilaterally.           R    L  Motor: Hip flexors  5/5    5/5   Quads   5/5    5/5   Hamstrings  5/5    5/5   DF   5/5    5/5   EHL   5/5    5/5   Plantar Flexor  5/5    5/5    Sensory(LT):    Sensory was intact to bilateral L2-S2         R    L  Reflex Knee Jerk:  2    2  Medial Hamstring  2    2  Ankle Jerk:    2    2    Provacative testing:      R  L  SLR   neg  neg  Slump Test  neg  neg  Facet Grind  neg  neg  Elzbieta's Finger (x2) neg  neg     ROM of Back:    Flexion: 90*  Extension:  20*    ROM of hip: Normal and symmetrical bilaterally     R  L  Internal Rotation 40  40*  External Rotation 60  60*    Gait: Reciprocal pattern with no assistive device, nonantalgic, appropriate step length and toe clearance, appropriate speed, no Trendelenburg. Impression:   Phyllis Gama is a 30 y.o. male who presents with low back pain, right hip pain, right shoulder pain secondary to axial low back pain, gluteal tendinopathy of the right, and right shoulder impingement syndrome, respectively. 1. Chronic bilateral low back pain without sciatica    2. Chronic right shoulder pain    3. Impingement syndrome of right shoulder    4. Cervical myofascial pain syndrome    5. Tendinopathy of right gluteal region        Recommendations:  1. Discussion: I have discussed the natural history of the above diagnoses with him in detail, with more than 1/2 of the visit spent counseling him on the pathophysiology and treatment options. 2. Activity Modification: Patient to continue being as physically active as possible while avoiding complete inactivity. No current restrictions placed. 3. Medications: Acetaminophen 1000 mg 3 times daily PRN for pain. Instructed to take no more than 3000 mg daily or with alcohol. Patient should try to utilize acetaminophen for pain control in order to cut back on or stop NSAIDs due to the inherent risks of GI bleeding, MI, CVA associated with these medications. Also, prescription sent for diclofenac anti-inflammatory/pain cream to apply to lateral right hip  4.  Referrals: Physical therapy for a comprehensive gluteal tendinopathy and low back pain program including core strengthening, stretching, range of motion, pain modalities, establishing HEP. No surgical referral needed at this point. 5. Images: We will obtain an x-ray of the right shoulder. 6. Labs: None today. 7. DME: None today. 8. Injection: None today. Will reschedule for possible ultrasound-guided GTB steroid injection. 9. Follow-up: For injection. At that time we will review progress with above interventions. The patient was educated about the diagnosis, prognosis, indications, risks and benefits of treatment. An opportunity to ask questions was given to the patient and questions were answered. The patient agreed to proceed with the recommended treatment as described above. Thank you for the consultation and for allowing me to participate in the care of this patient. Sincerely,     Jan Bennett., DO  Physical Medicine and Rehabilitation     Please note that the above documentation was prepared using voice recognition software. Every attempt was made to ensure accuracy but there may be spelling, grammatical, and contextual errors.

## 2020-09-23 ENCOUNTER — OFFICE VISIT (OUTPATIENT)
Dept: PHYSICAL MEDICINE AND REHAB | Age: 35
End: 2020-09-23
Payer: COMMERCIAL

## 2020-09-23 VITALS
WEIGHT: 178 LBS | HEART RATE: 94 BPM | BODY MASS INDEX: 27.94 KG/M2 | HEIGHT: 67 IN | DIASTOLIC BLOOD PRESSURE: 68 MMHG | SYSTOLIC BLOOD PRESSURE: 114 MMHG | OXYGEN SATURATION: 98 %

## 2020-09-23 PROCEDURE — G8427 DOCREV CUR MEDS BY ELIG CLIN: HCPCS | Performed by: PHYSICAL MEDICINE & REHABILITATION

## 2020-09-23 PROCEDURE — 20552 NJX 1/MLT TRIGGER POINT 1/2: CPT | Performed by: PHYSICAL MEDICINE & REHABILITATION

## 2020-09-23 PROCEDURE — 99213 OFFICE O/P EST LOW 20 MIN: CPT | Performed by: PHYSICAL MEDICINE & REHABILITATION

## 2020-09-23 PROCEDURE — 76942 ECHO GUIDE FOR BIOPSY: CPT | Performed by: PHYSICAL MEDICINE & REHABILITATION

## 2020-09-23 PROCEDURE — G8419 CALC BMI OUT NRM PARAM NOF/U: HCPCS | Performed by: PHYSICAL MEDICINE & REHABILITATION

## 2020-09-23 PROCEDURE — 1036F TOBACCO NON-USER: CPT | Performed by: PHYSICAL MEDICINE & REHABILITATION

## 2020-09-23 RX ORDER — TRIAMCINOLONE ACETONIDE 40 MG/ML
40 INJECTION, SUSPENSION INTRA-ARTICULAR; INTRAMUSCULAR ONCE
Status: COMPLETED | OUTPATIENT
Start: 2020-09-23 | End: 2020-09-23

## 2020-09-23 RX ORDER — LIDOCAINE HYDROCHLORIDE 10 MG/ML
4 INJECTION, SOLUTION INFILTRATION; PERINEURAL ONCE
Status: COMPLETED | OUTPATIENT
Start: 2020-09-23 | End: 2020-09-23

## 2020-09-23 RX ADMIN — LIDOCAINE HYDROCHLORIDE 4 ML: 10 INJECTION, SOLUTION INFILTRATION; PERINEURAL at 14:12

## 2020-09-23 RX ADMIN — TRIAMCINOLONE ACETONIDE 40 MG: 40 INJECTION, SUSPENSION INTRA-ARTICULAR; INTRAMUSCULAR at 14:13

## 2020-09-23 NOTE — PROGRESS NOTES
[facility]  [address]  Phone: [phone]  Fax: [phone]    CC:   Chief Complaint   Patient presents with    Lower Back Pain     right sided low back pain radiates to righ hip- requesting injection       HPI:   Brandy Gonzalez is a 30 y.o. male with pertinent past medical history of lactose intolerance, allergic rhinitis presenting with right-sided low back pain and right lateral hip pain. This is been present for several years without major injury at onset. On the right, he ranks this is a 7/10 today. He admits his pain was especially bad last night due to sitting for long period of time at work. From the right side of the low back, there is radiation into right lateral hip and down posterior thigh. No new neurological changes compared to last visit on 9/16/2020. ROS:  Constitutional: Denies fevers, chills, night sweats, unintentional weight loss      Exam:   Vitals:    09/23/20 1256   BP: 114/68   Pulse: 94   SpO2: 98%        GEN APPEARANCE: Pleasant, well developed, well nourished in no acute distress; Alert and Oriented. Body habitus is average  PSYCH: Normal mood and affect   RESP: Breathing non-labored  SKIN: No lesions grossly visible on lumbar back  MSK: Normal appearing muscle bulk in bilateral lower extremities     Musculoskeletal Ultrasound Performed at Today's Visit:  Purpose:  US Guided Intervention  Laterality:   Right  Structure:  Sacroiliac joint  Settings / Approach:  Curvilinear  Interventions:     · Universal protocol documentation / Pre-Procedure Checklist:   ? ID verified by two sources (select any two from list): MRN and Name     ? Site:  Right sacroiliac joint  ? Procedure:  Injection with Kenalog  ? Site(s) marked: yes     ? Position:  Prone  ? Consent: available     ? History and Physical done in chart  ? Other relevant documentation: available     ?  Relevant images: available ? Implants: not applicable     ? Special Equipment: Musculoskeletal Ultrasound Guidance with image / CINE recording   ? Blood products matched: not applicable     ? Surgical/Procedure pause: yes     ? Other pre-procedural information: given     ? Patient concurs: yes     · Team present and concurs:  Moose Mcrae DO; Jaun Moran MA  · Procedure Note   ? Medications used:   1 mL 1% lidocaine and 1 mL of 40 mg/mL Kenalog along with 3 mL 1% lidocaine to anesthetize the skin  ? Description of procedure:  After having explained the potential risks  (patient informed of risk of pain, local bleeding, infection, hyperglycemia, hypertension, lack of benefit, cartilage decay) and benefits of the right sacroiliac joint steroid injection, and after reviewing the patient's medication and at least two pertinent identifiers, the patient gave verbal consent to proceed. Written consent was also completed and will be scanned into the chart. A preprocedural time-out was performed. The patient was then positioned and prepped in the usual sterile fashion with chloroprep, and right-sided target was identified with palpation and musculoskeletal ultrasound using settings described above. After anesthetizing the overlying skin with 3 ml of 1 % lidocaine using a 25-gauge 1.5 inch needle, a 22-gauge 3.5 inch needle was advanced under ultrasound guidance to the right sacroiliac joints via transverse approach from the medial to the lateral.  [place] was utilized during injection to avoid vessels that were visualized on ultrasound. After non-bloody aspiration to confirm extravascular needle tip placement, 2 ml of the above mixture was injected without resistance. There was negligible blood loss and no complications. An adhesive bandage was placed over the injection site. The patient tolerated the procedure well, and remained stable throughout. Immediately after the procedure, patient noted improvement in pain.   The patient left in baseline health status. Preprocedural pain 7/10  Post procedural pain 0/10    Impression:  Miranda Salgado is a 30 y.o. male presenting with chronic right-sided low back pain secondary to axial low back pain and right sacroiliac joint dysfunction. Patient was advised to watch for any signs of infection in the area of the injection (redness, streaky appearance of skin, etcetera) and call the office immediately for treatment if those symptoms develop. Patient is to follow-up with me in 2 weeks to review progress after injection. Instructed to not soak area in liquid (bath, pool, hot tub, etc.) for 2 days - showers are OK. Patient is to continue basic ADLs today. No restrictions after today. Thank you for the consultation and for allowing me to participate in the care of this patient. Sincerely,      Jan Brenner., DO  Physical Medicine and Rehabilitation      Pre-injection:      Needle view:

## 2020-10-09 ENCOUNTER — OFFICE VISIT (OUTPATIENT)
Dept: PHYSICAL MEDICINE AND REHAB | Age: 35
End: 2020-10-09
Payer: COMMERCIAL

## 2020-10-09 VITALS
BODY MASS INDEX: 27.94 KG/M2 | HEART RATE: 96 BPM | WEIGHT: 178 LBS | OXYGEN SATURATION: 98 % | HEIGHT: 67 IN | DIASTOLIC BLOOD PRESSURE: 74 MMHG | TEMPERATURE: 97.7 F | SYSTOLIC BLOOD PRESSURE: 120 MMHG

## 2020-10-09 PROCEDURE — G8419 CALC BMI OUT NRM PARAM NOF/U: HCPCS | Performed by: PHYSICAL MEDICINE & REHABILITATION

## 2020-10-09 PROCEDURE — 99213 OFFICE O/P EST LOW 20 MIN: CPT | Performed by: PHYSICAL MEDICINE & REHABILITATION

## 2020-10-09 PROCEDURE — G8427 DOCREV CUR MEDS BY ELIG CLIN: HCPCS | Performed by: PHYSICAL MEDICINE & REHABILITATION

## 2020-10-09 PROCEDURE — G8484 FLU IMMUNIZE NO ADMIN: HCPCS | Performed by: PHYSICAL MEDICINE & REHABILITATION

## 2020-10-09 PROCEDURE — 1036F TOBACCO NON-USER: CPT | Performed by: PHYSICAL MEDICINE & REHABILITATION

## 2020-10-09 NOTE — PROGRESS NOTES
[facility]  [address] [facility], [address] [facility]  Phone: [phone]  Fax: [phone]      Follow Up Evaluation for Kamini Eastman  : 1985  MRN: [id number]  PCP: Jagdeep Krishnamurthy DO  REF: No ref. provider found  Date of visit: 10/9/20  Last Visit: 20 for US SIJ inj. As you know, this is a 30 y.o. male with pertinent past medical history of bilateral low back pain, right hip pain, right shoulder pain. As you know, this is a 30 y.o. male with pertinent past medical history of lactose intolerance, allergic rhinitis. Chief Complaint   Patient presents with    Lower Back Pain     b/l low back pain - injection was very helpful       HPI:   Patient presents today in follow-up regarding bilateral low back pain and right hip pain. Recall, on last visit, right-sided ultrasound-guided sacroiliac joint steroid injection was performed. Patient states this has resolved essentially all of his right hip pain. He continues to experience low back pain bilaterally. Today, his low back pain is a 7/10. His hip pain is 0/10. Patient states his back pain was aggravated yesterday by doing yard work. Typically after the injection, it was a 3/10. There is no radiation to lower extremities. Rest, and obviously SIJ injection, does make his pain better. He is establishing with physical therapy in the next coming weeks for his low back pain. The patient had no side effects from the steroid injection. He had no issues with infection or irritation at the injection site. No new N/T, weakness. No new B/B changes. The prior workup has included: Xray.      Diagnostic Studies:  - XR lumbar spine from [facility] dated 2020 (reviewed personally on 10/9/2020) demonstrates:   Impression    Minimal retrolisthesis of L4 on L5.       - XR right hip from [facility] dated 2020 (reviewed personally on 10/9/2020) demonstrates:    Impression    Mild joint space narrowing of the right hip.  No acute osseous abnormality.           Current Outpatient Medications   Medication Sig Dispense Refill    ELDERBERRY PO Take by mouth      diclofenac sodium (VOLTAREN) 1 % GEL Apply 4 g topically 4 times daily 2 Tube 5    montelukast (SINGULAIR) 10 MG tablet Take 1 tablet by mouth daily 90 tablet 3    cyclobenzaprine (FLEXERIL) 5 MG tablet Take 1 tablet by mouth nightly as needed for Muscle spasms 30 tablet 0    SUMAtriptan (IMITREX) 25 MG tablet 1 tab PO at onset of headache. May repeat x1 dose in 2 hours if HA persists. Max of 2 pills per day PRN (Patient not taking: Reported on 8/31/2019) 9 tablet 5     No current facility-administered medications for this visit. Past Medical History:   Diagnosis Date    Allergic rhinitis     Lactose intolerance        Past Surgical History:   Procedure Laterality Date    ANTERIOR CRUCIATE LIGAMENT REPAIR      ECHOCARDIOGRAM EXERCISE STRESS TEST  11/6/2013         TONSILLECTOMY         Functional Status: The patient is able to ambulate and perform activities of daily living without the use of an assistive device. ROS:    Constitutional: Denies fevers, chills, unintentional weight loss     Physical Exam:   Blood pressure 120/74, pulse 96, temperature 97.7 °F (36.5 °C), temperature source Infrared, height 5' 7\" (1.702 m), weight 178 lb (80.7 kg), SpO2 98 %. PAIN: 7/10  GEN APPEARANCE: Pleasant, well developed, well nourished in no acute distress; Alert and Oriented; body habitus is average. PSYCH: Normal mood and affect   SKIN: No lesions grossly visible on low back    NEURO -   Strength:   R  L  Knee Ext  5  5  Knee Flex  5 5  Ankle dorsi  5  5  EHL   5 5  Ankle Plantar  5  5    Gait: Reciprocal pattern with no assistive device, nonantalgic, appropriate step length and toe clearance, appropriate speed, no Trendelenburg.      Impression:   Kamini Eastman is a 30 y.o. male who presents with chronic low back pain and right sacroiliac joint pain secondary to axial low back pain. 1. Chronic bilateral low back pain without sciatica    2. Chronic right sacroiliac joint pain        Recommendations:  1. Discussion: I have discussed the natural history of the above diagnoses with him in detail, with more than 1/2 of the visit spent counseling him on the pathophysiology and treatment options. 2. Activity Modification: Patient to continue being as physically active as possible while avoiding complete inactivity. No current restrictions placed. 3. Medications: No medication changes made at this time. 4. Referrals: None placed today. Continue with plan of physical therapy for low back and sacroiliac joint pain. No surgical referral needed at this point. 5. Images: No red flags on examination today, such as severe or progressive neurological deficits or suspicion of serious underlying condition. With that said, additional imaging not indicated at this time. We will continue to monitor response to conservative treatment. 6. Labs: None today. 7. DME: None today. 8. Injection: None today. 9. Follow-up: 2 months to discuss progress with physical therapy and also other MSK issues such as shoulder pain. At that time we will review progress with above interventions. The patient was educated about the diagnosis, prognosis, indications, risks and benefits of treatment. An opportunity to ask questions was given to the patient and questions were answered. The patient agreed to proceed with the recommended treatment as described above. Sincerely,     Jan King, DO  Physical Medicine and Rehabilitation     Please note that the above documentation was prepared using voice recognition software. Every attempt was made to ensure accuracy but there may be spelling, grammatical, and contextual errors.

## 2020-10-19 ENCOUNTER — EVALUATION (OUTPATIENT)
Dept: PHYSICAL THERAPY | Age: 35
End: 2020-10-19
Payer: COMMERCIAL

## 2020-10-19 PROCEDURE — 97162 PT EVAL MOD COMPLEX 30 MIN: CPT | Performed by: PHYSICAL THERAPIST

## 2020-10-19 NOTE — PROGRESS NOTES
[address] [facility]   Phone: [phone]   Fax: [phone]           Date:  10/19/2020   Patient: Aby Shepard  : 1985  MRN: [id number]  Referring Provider: Marilu Galeazzi, DO  [address] [address] [place], [address] [facility]     Medical Diagnosis:   M25.511, G89.29 (ICD-10-CM) - Chronic right shoulder pain    M54.5, G89.29 (ICD-10-CM) - Chronic bilateral low back pain without sciatica    M79.18 (ICD-10-CM) - Cervical myofascial pain syndrome    M76.01 (ICD-10-CM) - Tendinopathy of right gluteal region            SUBJECTIVE:     Onset date: back pain - 10 years, neck /shoulder pain 6 months     Onset: sudden onset - back pain, insidious onset - neck pain     Mechanism of Injury:  Pt reports was carrying his son and slipped and fell on slate floor. Pt reports thinks that is what caused the hip and more recent back pain - happened about a year ago. Pt reports sleeps on stomach and think that contributes to neck pain. Pt reports sleeps with R arm above his head and wakes up with numbness in R UE and has pain in R shoulder if he throws anything. Pt reports his priorities are to work on his back and neck and then work on shoulder. Previous PT: yes - helped    Medical Management for Current Problem: injections, cream    Chief complaint: back - pain with bending activity, and difficulty walking. Behavior: condition is staying the same    Pain: waxing and waning  Current: /10     Best: 2/10     Worst:8/10    Symptom Type/Quality: sharp, nagging  Location[de-identified] Back: lumbar region radiates down the posterior right leg  ; base of neck into R shoulder        Provoking Activities/Positions: sitting, standing, bending, lying on stomach - back ; neck - worse as day goes on                 Relieving Activitie/Positions: decompression board pt lays on, neck - good in morning    Disturbed Sleep: no  Bladder Dysfunction: no  Bowel Dysfunction: no     Imaging results: No results found.     Past Barriers to this patient's plan of care or recovery include. Domestic Concerns:  [x] No  [] Yes:    Reason for skilled Care:  Pt presents to therapy with multiple areas of pain. Pt will benefit from skilled PT to increase ROM, strength and decrease pain for overall improvement in mobility. Short Term goals (3 weeks)   Decrease reported pain to 0-4/10   Increase ROM by 10%    Increase Strength to 5-/5    Able to perform/complete the following functions/tasks: pt will be able to stand 45 minutes with minor pain/limitation. Pt will be able to sit 1.5 hours with minor pain/limitation. Pt will be able to bend and lift 10 pounds from the floor five times with minor pain/limitation.  Oswestry Low Back Disability Questionnaire 20% disability   Neck Disability Index (NDI) 8%    Long Term goals (6 weeks)   Decrease reported pain to 0-3/10   Increase ROM to WNL   Increase Strength to 5/5    Able to perform/complete the following functions/tasks: pt will be able to stand 1 hour with no pain/limitation. Pt will be able to sit 2 hours with no pain/limitation. Pt will be able to bend and lift 20 pounds from the floor five times with no pain/limitation.      Oswestry Low Back Disability Questionnaire 15% disability    Neck Disability Index (NDI) 5%   Independent with Home Exercise Programs    Rehab Potential: [x] Good  [] Fair  [] Poor    PLAN       Treatment Plan:   [x] Therapeutic Exercise  [x] Therapeutic Activity  [x] Neuromuscular Re-education   [] Gait Training  [x] Balance Training  [x] Aerobic conditioning  [x] Manual Therapy  [x] Massage/Fascial release   [] Work/Sport specific activities    [] Pain Neuroscience [x] Cold/hotpack  [] Vasocompression  [x] Electrical Stimulation  [x] Lumbar/Cervical Traction  [x] Ultrasound   [] Iontophoresis: 4 mg/mL Dexamethasone Sodium Phosphate 40-80 mAmin        [x] Instruction in HEP      []  Medication allergies reviewed for use of Dexamethasone Sodium Phosphate 4mg/ml  with iontophoresis treatments. Patient is not allergic. The following CPT codes are likely to be used in the care of this patient: 27173 PT Evaluation: Moderate Complexity , 19915 PT Re-Evaluation , 12315 Therapeutic Exercise , 62585 Neuromuscular Re-Education , 25495 Therapeutic Activities , 61060 Manual Therapy , 21796 Mechanical Traction ,  Electrical Stimulation, 05540 US      Suggested Professional Referral: [x] No  [] Yes:     Patient Education:  [x] Plans/Goals, Risks/Benefits discussed  [x] Home exercise program  Method of Education: [x] Verbal  [x] Demo  [x] Written  Comprehension of Education:  [x] Verbalizes understanding. [x] Demonstrates understanding. [] Needs Review. [] Demonstrates/verbalizes understanding of HEP/Ed previously given. Frequency:  2 days per week for 6 weeks    Patient understands diagnosis/prognosis and consents to treatment, plan and goals: [x] Yes    [] No     Thank you for the opportunity to work with your patient. If you have questions or comments, please contact me at numbers listed above. Electronically signed by: Dang Ellis, PT DPT [id number]         Please sign Physician's Certification and return to: [address] [place] [address] PT  [address] [place] [address] [place] [phone]  Dept: [phone]  Dept Fax: [phone] PT DPT [id number]    [id number] Certification / Comments     Frequency/Duration 2 days per week for 6 weeks. Certification period from 10/19/2020  to 12/4/2020. I have reviewed the Plan of Care established for skilled therapy services and certify that the services are required and that they will be provided while the patient is under my care.     Physician's Comments/Revisions:               Physician's Printed Name:                                           [de-identified] Signature:                                                               Date:

## 2020-10-19 NOTE — PROGRESS NOTES
[address] [facility]   Phone: [phone]   Fax: [phone]      Physical Therapy Treatment Note    Date: 10/19/2020  Patient Name: Kamini Eastman  : 1985   MRN: [id number]  DOInjury: back/hip - 1 year, neck - 6 months   DOSx: NA  Referring Provider: Meghann Ardon DO  [address] [facility], [address] [facility]     Medical Diagnosis:   M25.511, G89.29 (ICD-10-CM) - Chronic right shoulder pain    M54.5, G89.29 (ICD-10-CM) - Chronic bilateral low back pain without sciatica    M79.18 (ICD-10-CM) - Cervical myofascial pain syndrome    M76.01 (ICD-10-CM) - Tendinopathy of right gluteal region        Outcome Measure:  Oswestry 26%, NDI 12%    S: see eval  O:  Time 0522- 9966      Visit  Repeat outcome measure at mid point and end. Pain 5/10     ROM      Modalities      MH + ES            Exercise      ALL EXERCISE DONE WITH DRAW-IN TECHNIQUE                            Functional activities To aid in reaching , pushing, pulling tasks at home     ROWS: H  \"    ROWS: M  \"    ROWS: L  \"    Obliques - high  \"    Obliques - low  \"     THEREX     Bike      Punches      Lat pulldowns      Triceps ext standing      Marching            Trunk ext TB      Trunk flex TB      Hip abd      Hip EXT      TG Squats                  A:  Tolerated well. Pt reports back bothers him the most, then neck, then shoulder. Discussed with pt and pt wants to work on back and neck first and address shoulder later.     P: Continue with rehab plan  Bessy Joy, PT  PT DPT [id number]    Treatment Charges: Mins Units   Initial Evaluation 37 1   Re-Evaluation     Ther Exercise         TE     Manual Therapy     MT     Ther Activities        TA     Gait Training          GT     Neuro Re-education NR     Modalities     Non-Billable Service Time     Other     Total Time/Units 37  1

## 2020-10-22 ENCOUNTER — TREATMENT (OUTPATIENT)
Dept: PHYSICAL THERAPY | Age: 35
End: 2020-10-22
Payer: COMMERCIAL

## 2020-10-22 ENCOUNTER — HOSPITAL ENCOUNTER (OUTPATIENT)
Dept: SLEEP CENTER | Age: 35
Discharge: HOME | End: 2020-10-22
Payer: COMMERCIAL

## 2020-10-22 PROCEDURE — G0399 HOME SLEEP TEST/TYPE 3 PORTA: HCPCS

## 2020-10-22 PROCEDURE — 97110 THERAPEUTIC EXERCISES: CPT | Performed by: PHYSICAL THERAPIST

## 2020-10-24 NOTE — PROGRESS NOTES
[address] [address]                               SLEEP STUDY REPORT    PATIENT NAME: Debby Austin                  :        1985  MED REC NO:   [id number]                            ROOM:  ACCOUNT NO:   [de-identified]                           ADMIT DATE: 10/22/2020  PROVIDER:     Dolores Sapp MD    DATE OF STUDY:  10/22/2020    REFERRING PROVIDER:  Viktoriya Denson DO    STUDY PERFORMED:  Polysomnography. INDICATION FOR TESTING:  Witnessed apnea, wakes gasping, loud snoring,  restless sleep, morning headaches, night sweats, bruxism, and frequent  waking to urinate. CURRENT MEDICATIONS:  None listed. INTERPRETATION:  This polysomnogram was performed as a home sleep apnea  test.  An apnea link air monitoring device was utilized for the study. Total recording time was 6 hours and 32 minutes. Flow evaluation time  was 6 hours and 20 minutes and oxygen saturation evaluation time was 6  hours and 21 minutes. RESPIRATION SUMMARY:  APNEA:  There were eight apneic events, which were all obstructive. HYPOPNEA:  There were 24 hypopneic events. RESPIRATORY EVENT INDEX:  The respiratory event index is five. OXYGEN SATURATION:  Baseline oxygen saturation was 97%. Lowest  saturation was 78%. The patient spent 27% of the time with saturation  less than 90%. Two percent of the time, saturation was less than 80%. HEART RATE SUMMARY:  Average heart rate was 73 beats per minute. Maximum heart rate was 119 beats per minute and minimum heart rate was  56 beats per minute. FLOW LIMITATION:  Flow limitation breaths without snore occurred 669  times. Flow limitation breaths with snore occurred 309 times. There  were 968 snore events. Snoring was moderate. MISCELLANEOUS:  Panther Sleepiness Scale score is 5/24. BMI is 26.6 kg  per meter squared.   Neck circumference 16.5 inches. IMPRESSION:  1. Very mild obstructive sleep apnea. 2.  Nocturnal hypoxia. 3.  Moderate snoring. PLAN:  1. Auto-CPAP at 5 to 15 cm of water pressure. 2.  The patient to be seen in 6 to 10 weeks. 3.  When the patient seen, if symptoms persist or CPAP download is  abnormal, the patient will be sent back to [address] [place] [address]  for an in-lab titration study.         Dominik Webster MD  Diplomat of Sleep Medicine    D: 10/23/2020 15:29:14       T: 10/23/2020 15:38:54     [email or web address]  Job#: [id number]     Doc#: [id number]    CC:

## 2020-11-03 ENCOUNTER — TREATMENT (OUTPATIENT)
Dept: PHYSICAL THERAPY | Age: 35
End: 2020-11-03
Payer: COMMERCIAL

## 2020-11-03 PROCEDURE — 97110 THERAPEUTIC EXERCISES: CPT | Performed by: PHYSICAL THERAPIST

## 2020-11-03 NOTE — PROGRESS NOTES
[address] [facility]   Phone: [phone]   Fax: [phone]      Physical Therapy Treatment Note    Date: 11/3/2020  Patient Name: Kraig Mondragon  : 1985   MRN: [id number]  DOInjury: back/hip - 1 year, neck - 6 months   DOSx: NA  Referring Provider: No referring provider defined for this encounter. Medical Diagnosis:   M25.511, G89.29 (ICD-10-CM) - Chronic right shoulder pain    M54.5, G89.29 (ICD-10-CM) - Chronic bilateral low back pain without sciatica    M79.18 (ICD-10-CM) - Cervical myofascial pain syndrome    M76.01 (ICD-10-CM) - Tendinopathy of right gluteal region        Outcome Measure:  Oswestry 26%, NDI 12%    S: pt reports 3/10 pain in back today. Pt reports last week was worse but today is not too bad. Pt reports did some stretching and it helped. O:  Time 7097 - 8981     Visit 3/12 Repeat outcome measure at mid point and end. Pain 3/10     ROM      Modalities      MH + ES            Exercise      ALL EXERCISE DONE WITH DRAW-IN TECHNIQUE                            Functional activities To aid in reaching , pushing, pulling tasks at home     ROWS: H  \"    ROWS: M  \"    ROWS: L  \"    Obliques - high  \"    Obliques - low  \"     THEREX     Bike 10 minutes   TE   Punches      Lat pulldowns      Triceps ext standing      Marching      Bridging  2 x 10      Lumbar rotations    TE   QL stretch  10 x 10s   TE   SKTC 10 x 5s B   TE   DKTC 10 x 5s      Prone prop      Trunk ext TB      Dead bug  2  X 10  1 set of 10 alt LE, 1 set of 10 alt LE and UE    Hip abd      SB flexion stretch  5 x 10s      TG Squats      Prone pressups       PPT 3 x 10 x 5s      A:  Tolerated well. Pt reports no pain with DKTC today. Pt reports some hip discomfort with SB flexion stretch.      P: Continue with rehab plan  Yemi Rizvi PT  PT DPT [id number]    Treatment Charges: Mins Units   Initial Evaluation     Re-Evaluation     Ther Exercise         TE 40 3   Manual Therapy     MT Ther Activities        TA     Gait Training          GT     Neuro Re-education NR     Modalities     Non-Billable Service Time     Other     Total Time/Units 40 3

## 2020-11-23 ENCOUNTER — TREATMENT (OUTPATIENT)
Dept: PHYSICAL THERAPY | Age: 35
End: 2020-11-23
Payer: COMMERCIAL

## 2020-11-23 PROCEDURE — 97110 THERAPEUTIC EXERCISES: CPT | Performed by: PHYSICAL THERAPIST

## 2020-11-23 NOTE — PROGRESS NOTES
[address] [facility]   Phone: [phone]   Fax: [phone]      Physical Therapy Treatment Note    Date: 2020  Patient Name: Yumiko Mcclure  : 1985   MRN: [id number]  DOInjury: back/hip - 1 year, neck - 6 months   DOSx: NA  Referring Provider: No referring provider defined for this encounter. Medical Diagnosis:   M25.511, G89.29 (ICD-10-CM) - Chronic right shoulder pain    M54.5, G89.29 (ICD-10-CM) - Chronic bilateral low back pain without sciatica    M79.18 (ICD-10-CM) - Cervical myofascial pain syndrome    M76.01 (ICD-10-CM) - Tendinopathy of right gluteal region        Outcome Measure:  Oswestry 26%, NDI 12%    S: pt reports no pain today but had pain last night, felt tight, did some stretches and helped somewhat. O:  Time 1146- 1218     Visit  Repeat outcome measure at mid point and end. Pain 0/10     ROM      Modalities      MH + ES            Exercise      ALL EXERCISE DONE WITH DRAW-IN TECHNIQUE                            Functional activities To aid in reaching , pushing, pulling tasks at home     ROWS: H  \"    ROWS: M  \"    ROWS: L  \"    Obliques - high  \"    Obliques - low  \"     THEREX     Bike 10 minutes   TE   Punches      Lat pulldowns      Triceps ext standing      Marching      Bridging  2 x 10      Lumbar rotations    TE   QL stretch  10 x 10s   TE   SKTC 2 x 10 x 5s B   TE   DKTC 2 x 10 x 5s      Prone prop      Trunk ext TB      Dead bug  2  X 10  2 set of 10 alt LE only    SB marching  2  X 5      SB flexion stretch      TG Squats     Prone pressups       PPT 3 x 10 x 5s      A:  Tolerated well. Pt reports stretching makes pain more manageable at home.      P: Continue with rehab plan  Vidhi Abel, PT  PT DPT [id number]    Treatment Charges: Mins Units   Initial Evaluation     Re-Evaluation     Ther Exercise         TE 32 2   Manual Therapy     MT     Ther Activities        TA     Gait Training          GT     Neuro Re-education NR Modalities     Non-Billable Service Time     Other     Total Time/Units 32 2

## 2020-11-25 ENCOUNTER — TREATMENT (OUTPATIENT)
Dept: PHYSICAL THERAPY | Age: 35
End: 2020-11-25
Payer: COMMERCIAL

## 2020-11-25 PROCEDURE — 97110 THERAPEUTIC EXERCISES: CPT | Performed by: PHYSICAL THERAPIST

## 2020-11-30 ENCOUNTER — TREATMENT (OUTPATIENT)
Dept: PHYSICAL THERAPY | Age: 35
End: 2020-11-30
Payer: COMMERCIAL

## 2020-11-30 PROCEDURE — 97110 THERAPEUTIC EXERCISES: CPT | Performed by: PHYSICAL THERAPIST

## 2020-11-30 NOTE — PROGRESS NOTES
[address] [facility]   Phone: [phone]   Fax: [phone]      Physical Therapy Treatment Note    Date: 2020  Patient Name: Mendez Solis  : 1985   MRN: [id number]  DOInjury: back/hip - 1 year, neck - 6 months   DOSx: NA  Referring Provider: No referring provider defined for this encounter. Medical Diagnosis:   M25.511, G89.29 (ICD-10-CM) - Chronic right shoulder pain    M54.5, G89.29 (ICD-10-CM) - Chronic bilateral low back pain without sciatica    M79.18 (ICD-10-CM) - Cervical myofascial pain syndrome    M76.01 (ICD-10-CM) - Tendinopathy of right gluteal region        Outcome Measure:  Oswestry 26%, NDI 12%    S: pt reports pain in low back while putting [place] lights up. Pt reports was sitting on the roof for most of it. After discussion with pt, flexion for extended periods tends to bother low back. Pt reports no hip pain today. O:  Time 1145- 1229     Visit  Repeat outcome measure at mid point and end. Pain 6/10     ROM      Modalities      MH + ES            Exercise      ALL EXERCISE DONE WITH DRAW-IN TECHNIQUE                            Functional activities To aid in reaching , pushing, pulling tasks at home     ROWS: H  \"    ROWS: M  \"    ROWS: L  \"    Obliques - high  \"    Obliques - low  \"     THEREX     IT band stretch,  TE   Quad stretch  TE   Hamstring stretch   TE               bike 10 minutes   TE   punches      Lat pulldowns      Triceps ext standing      Marching      Bridging  2 x 10   TE   Lumbar rotations    TE   QL stretch  10 x 10s   TE   SKTC 2 x 10 x 5s B   TE   DKTC 2 x 10 x 5s      Prone prop      Trunk ext TB      Dead bug   2 set of 10 alt LE only    SB marching  2  X 5      SB flexion stretch      Sidestep with punch out Cable system 15#    Prone pressups       PPT 3 x 10 x 5s      A:  Tolerated well. Due to pt reports of flexion bothering pt, trialed prone prop to see if it would bring relief.   After 1 prone prop x 3 min pt reports pain now doing down R LE 6/10. Pt reports that his back also felt like the pain he gets when he first wakes up in the morning. Switched back to flexion based program.  After [facility] and [facility] pt reports pain as 4/10 in R LB and not down leg at all anymore. Will continue with flexion stretching based program with core stability training.         P: Continue with rehab plan  Sal Knight, PT  PT DPT [id number]    Treatment Charges: Mins Units   Initial Evaluation     Re-Evaluation     Ther Exercise         TE 44 3   Manual Therapy     MT     Ther Activities        TA     Gait Training          GT     Neuro Re-education NR     Modalities     Non-Billable Service Time     Other     Total Time/Units 44 3

## 2020-12-03 ENCOUNTER — TREATMENT (OUTPATIENT)
Dept: PHYSICAL THERAPY | Age: 35
End: 2020-12-03
Payer: COMMERCIAL

## 2020-12-03 PROCEDURE — 97110 THERAPEUTIC EXERCISES: CPT | Performed by: PHYSICAL THERAPIST

## 2020-12-03 NOTE — PROGRESS NOTES
[address] [facility]   Phone: [phone]   Fax: [phone]      Physical Therapy Treatment Note    Date: 12/3/2020  Patient Name: Fitz Chao  : 1985   MRN: [id number]  DOInjury: back/hip - 1 year, neck - 6 months   DOSx: NA  Referring Provider: No referring provider defined for this encounter. Medical Diagnosis:   M25.511, G89.29 (ICD-10-CM) - Chronic right shoulder pain    M54.5, G89.29 (ICD-10-CM) - Chronic bilateral low back pain without sciatica    M79.18 (ICD-10-CM) - Cervical myofascial pain syndrome    M76.01 (ICD-10-CM) - Tendinopathy of right gluteal region        Outcome Measure:  Oswestry 26%, NDI 12%    S: pt reports 4/10 pain today in back and some in hip. Reports did hip stretches last night to help. Reports feels approximately 20% from initial evaluation and that stretches do help. O:  Time 1148 - 1234     Visit  Repeat outcome measure at mid point and end. Pain 4/10     ROM      Modalities      MH + ES            Exercise      ALL EXERCISE DONE WITH DRAW-IN TECHNIQUE                            Functional activities To aid in reaching , pushing, pulling tasks at home     ROWS: H  \"    ROWS: M  \"    ROWS: L  \"    Obliques - high  \"    Obliques - low  \"     THEREX     IT band stretch,  TE   Quad stretch  TE   Hamstring stretch   TE   Modified piriformis stretch  R 3 x 20s            bike 10 minutes   TE   punches      Lat pulldowns      Triceps ext standing      Marching      Bridging  2 x 10   TE   Lumbar rotations    TE   QL stretch  10 x 10s   TE   SKTC 2 x 10 x 5s B   TE   DKTC 2 x 10 x 5s      Prone prop      Brace bent leg alt lower 2 x 10     Dead bug  2  X 10  2 set of 10 alt LE only    SB marching  2  X 5      SB flexion stretch      Sidestep with punch out Cable system 15#    Prone pressups       PPT 3 x 10 x 5s      A:  Tolerated well. Stabilization exercises increased today and piriformis stretch added with good tolerance.    Pt reports follows up with referring provider next week.      P: Continue with rehab plan  Dwayne Haskins, PT  PT DPT [id number]    Treatment Charges: Mins Units   Initial Evaluation     Re-Evaluation     Ther Exercise         TE 46 3   Manual Therapy     MT     Ther Activities        TA     Gait Training          GT     Neuro Re-education NR     Modalities     Non-Billable Service Time     Other     Total Time/Units 46 3

## 2020-12-09 ENCOUNTER — OFFICE VISIT (OUTPATIENT)
Dept: PHYSICAL MEDICINE AND REHAB | Age: 35
End: 2020-12-09
Payer: COMMERCIAL

## 2020-12-09 ENCOUNTER — TREATMENT (OUTPATIENT)
Dept: PHYSICAL THERAPY | Age: 35
End: 2020-12-09
Payer: COMMERCIAL

## 2020-12-09 VITALS
TEMPERATURE: 97.3 F | BODY MASS INDEX: 26.37 KG/M2 | SYSTOLIC BLOOD PRESSURE: 116 MMHG | WEIGHT: 168 LBS | HEIGHT: 67 IN | DIASTOLIC BLOOD PRESSURE: 80 MMHG

## 2020-12-09 PROCEDURE — 99214 OFFICE O/P EST MOD 30 MIN: CPT | Performed by: PHYSICAL MEDICINE & REHABILITATION

## 2020-12-09 PROCEDURE — 1036F TOBACCO NON-USER: CPT | Performed by: PHYSICAL MEDICINE & REHABILITATION

## 2020-12-09 PROCEDURE — 97110 THERAPEUTIC EXERCISES: CPT | Performed by: PHYSICAL THERAPIST

## 2020-12-09 PROCEDURE — G8484 FLU IMMUNIZE NO ADMIN: HCPCS | Performed by: PHYSICAL MEDICINE & REHABILITATION

## 2020-12-09 PROCEDURE — G8427 DOCREV CUR MEDS BY ELIG CLIN: HCPCS | Performed by: PHYSICAL MEDICINE & REHABILITATION

## 2020-12-09 PROCEDURE — G8419 CALC BMI OUT NRM PARAM NOF/U: HCPCS | Performed by: PHYSICAL MEDICINE & REHABILITATION

## 2020-12-09 NOTE — PROGRESS NOTES
[facility]  [address] [facility], [address]  Phone: [phone]  Fax: [phone]      Follow Up Evaluation for Mervin Hudson  : 1985  MRN: [id number]  PCP: [facility] ETELVINA Phillips DO  REF: No ref. provider found  Date of visit: 20  Last Visit: 10/9/20    As you know, this is a 30 y.o. male with pertinent past medical history of bilateral low back pain, right hip pain, right shoulder pain. As you know, this is a 30 y.o. male with pertinent past medical history of lactose intolerance, allergic rhinitis. Chief Complaint   Patient presents with    Hip Pain     hip and back pain still currently doing Pt has 2 week off period due to covid but still has 3 appointments left currently still using diclofenac gel hit and miss with pain'; today he feels really good but other days its painful PT does not always help       HPI:   Patient presents today in follow-up regarding bilateral low back pain and right lateral hip pain. Recall, on 2020, right-sided ultrasound-guided sacroiliac joint steroid injection was performed. This injection did initially resolve his right hip pain. Patient states pain is under control, mildly worsened since last visit, rating it a 2-3/10. He is in physical therapy with roughly 3 sessions remaining and improvement in pain. The patient is interested in trialing chiropractic medicine. Right myofascial neck/shoulder pain resolved after massage. Pain began several years ago without major inciting event.    Location: bilateral low back, right lateral hip. No radiation past knees. Quality: sharp pain  Severity: 2-3/10.  Pain Alleviated by rest.  Hip aggravated by sitting or standing. Low back aggravated by laying on stomach.   Timing: Intermittent  Sensation: No numbness or tingling.       PRIOR INJURIES/TREATMENT:  Ice/Heat: Yes, modest relief  Brace: No  Medications:               Currently: Ibuprofen PRN, compounding creams              Past: Flexeril 5 mg  Physical Therapy: Currently, 3 sessions remaining, helpful for pain control  Chiropractic: Last tried in 2011 - some relief  Injection: R shoulder   Prior Surgery in location of pain: No, L ACL reconstruction  Prior Fracture/Injury in location of pain: No     Falls: No     No new N/T, weakness. No new B/B changes. The prior workup has included: Xray. OARRS reviewed. Diagnostic Studies:  - XR lumbar spine from [facility] dated 7/2/2020 (reviewed personally on 12/9/2020) demonstrates:   Impression    Minimal retrolisthesis of L4 on L5.       - XR right hip from [facility] dated 7/2/2020 (reviewed personally on 12/9/2020) demonstrates:    Impression    Mild joint space narrowing of the right hip.  No acute osseous abnormality.           Current Outpatient Medications   Medication Sig Dispense Refill    diclofenac sodium (VOLTAREN) 1 % GEL apply 4 grams topically four times a day 200 g 1    ELDERBERRY PO Take by mouth      montelukast (SINGULAIR) 10 MG tablet Take 1 tablet by mouth daily 90 tablet 3    cyclobenzaprine (FLEXERIL) 5 MG tablet Take 1 tablet by mouth nightly as needed for Muscle spasms 30 tablet 0    SUMAtriptan (IMITREX) 25 MG tablet 1 tab PO at onset of headache. May repeat x1 dose in 2 hours if HA persists. Max of 2 pills per day PRN (Patient not taking: Reported on 8/31/2019) 9 tablet 5     No current facility-administered medications for this visit. Past Medical History:   Diagnosis Date    Allergic rhinitis     Lactose intolerance        Past Surgical History:   Procedure Laterality Date    ANTERIOR CRUCIATE LIGAMENT REPAIR      ECHOCARDIOGRAM EXERCISE STRESS TEST  11/6/2013         TONSILLECTOMY         Functional Status: The patient is able to ambulate and perform activities of daily living without the use of an assistive device.          ROS:    Constitutional: Denies fevers, chills, unintentional weight loss Respiratory: Denies SOB, cough   MSK: See HPI   Neuro: See HPI     Physical Exam:   Blood pressure 116/80, temperature 97.3 °F (36.3 °C), height 5' 7\" (1.702 m), weight 168 lb (76.2 kg). PAIN: 2-3/10  GEN APPEARANCE: Pleasant, well developed, well nourished in no acute distress; Alert and Oriented; body habitus is average. PSYCH: Normal mood and affect   SKIN: No lesions grossly visible on low back    NEURO -   Strength:   R  L  Hip flexion   5 5  Knee Ext  5  5  Ankle dorsi  5  5  EHL   5 5  Ankle Plantar  5  5    DTRs: 2+ and symmetrical at bilateral patellar, hamstring, and Achilles tendons. Gait: Reciprocal pattern with no assistive device, nonantalgic, appropriate step length and toe clearance, appropriate speed, no Trendelenburg. Impression:   Sami Zabala is a 30 y.o. male who presents with chronic low back pain and right sacroiliac joint pain secondary to axial low back pain. 1. Chronic bilateral low back pain without sciatica    2. Chronic right sacroiliac joint pain    3. Cervical myofascial pain syndrome        Recommendations:  1. Discussion: I have discussed the natural history of the above diagnoses with him in detail, with more than 1/2 of the visit spent counseling him on the pathophysiology and treatment options. 2. Activity Modification: Patient to continue being as physically active as possible while avoiding complete inactivity. No current restrictions placed. 3. Medications: No medication changes made at this time. 4. Referrals: Chiropractic medicine. Continue with plan of physical therapy for low back and sacroiliac joint pain. No surgical referral needed at this point. 5. Images: No red flags on examination today, such as severe or progressive neurological deficits or suspicion of serious underlying condition. With that said, additional imaging not indicated at this time. We will continue to monitor response to conservative treatment.    6. Labs: None today.  7. DME: None today. 8. Injection: None today. 9. Follow-up: 2 months. At that time we will review progress with above interventions. The patient was educated about the diagnosis, prognosis, indications, risks and benefits of treatment. An opportunity to ask questions was given to the patient and questions were answered. The patient agreed to proceed with the recommended treatment as described above. Sincerely,     Jan Smith., DO  Physical Medicine and Rehabilitation     Please note that the above documentation was prepared using voice recognition software. Every attempt was made to ensure accuracy but there may be spelling, grammatical, and contextual errors.

## 2020-12-09 NOTE — PROGRESS NOTES
[address] [facility]   Phone: [phone]   Fax: [phone]      Physical Therapy Treatment Note    Date: 2020  Patient Name: Elan Dueñas  : 1985   MRN: [id number]  DOInjury: back/hip - 1 year, neck - 6 months   DOSx: NA  Referring Provider: Blake Morillo DO  [address] [place] [address], [address] [facility]     Medical Diagnosis:   M25.511, G89.29 (ICD-10-CM) - Chronic right shoulder pain    M54.5, G89.29 (ICD-10-CM) - Chronic bilateral low back pain without sciatica    M79.18 (ICD-10-CM) - Cervical myofascial pain syndrome    M76.01 (ICD-10-CM) - Tendinopathy of right gluteal region        Outcome Measure:  Oswestry 26%, NDI 12%    S: pt reports no back pain today. Pt reports had to sit for hours at the hospital with his son a few days ago and has had nagging R hip pain since then. With further questioning pt points to over area of R piriformis. O:  Time 1100- 1142     Visit  Repeat outcome measure at mid point and end.     Pain 0/10 in back   Nagging pain in R lateral buttock does not quantify     ROM      Modalities      MH + ES            Exercise      ALL EXERCISE DONE WITH DRAW-IN TECHNIQUE                            Functional activities To aid in reaching , pushing, pulling tasks at home     ROWS: H  \"    ROWS: M  \"    ROWS: L  \"    Obliques - high  \"    Obliques - low  \"     THEREX     IT band stretch, R 3 x 20sAdd to HEP TE   Quad stretch R 3 x 20sAdd to HEP TE   Hamstring stretch  R 3 x 20sAdd to HEP TE   Modified and regular piriformis stretch  R 3 x 20s   TE         bike 10 minutes   TE   punches      Lat pulldowns      Triceps ext standing      Marching      Bridging with hip add with ball 2 x 10   TE   Lumbar rotations    TE   QL stretch  10 x 10s   TE   SKTC 2 x 10 x 5s B   TE   DKTC      Prone prop      Brace knee fall out       Quadruped bird dog       Hip abd with BTB 2 x 15      Brace bent leg alt lower     Dead bug  2 set of 10 alt LE only    SB marching      SB flexion stretch      Sidestep with punch out Cable system 15#    Prone pressups       PPT      A:  Tolerated well.      P: Continue with rehab plan  Sal Knight, PT  PT DPT [id number]    Treatment Charges: Mins Units   Initial Evaluation     Re-Evaluation     Ther Exercise         TE 42 3   Manual Therapy     MT     Ther Activities        TA     Gait Training          GT     Neuro Re-education NR     Modalities     Non-Billable Service Time     Other     Total Time/Units 42 3

## 2020-12-10 ENCOUNTER — TREATMENT (OUTPATIENT)
Dept: PHYSICAL THERAPY | Age: 35
End: 2020-12-10
Payer: COMMERCIAL

## 2020-12-10 PROCEDURE — 97110 THERAPEUTIC EXERCISES: CPT | Performed by: PHYSICAL THERAPIST

## 2020-12-10 NOTE — PATIENT INSTRUCTIONS
way of dealing with migraine. A dose of 3 mg is recommended to start for headaches including cluster headache. Higher doses up to 15 mg has been reviewed for use in Cluster headache and have been used. The rationale behind using melatonin for cluster is that many theories regarding the cause of Cluster headache center around the disruption of the normal circadian rhythm in the brain. This helps restore the normal circadian rhythm. It should be taken at least 2 hours before bedtime. Prisca: Prisca has a small amount of anti-histamine and anti-inflammatory action which may help headache. It is primarily used for nausea and may aid in the absorption of other medications. 45 minutes

## 2020-12-10 NOTE — PROGRESS NOTES
[address] [facility]   Phone: [phone]   Fax: [phone]      Physical Therapy Treatment Note    Date: 12/10/2020  Patient Name: Supriya Torres  : 1985   MRN: [id number]  DOInjury: back/hip - 1 year, neck - 6 months   DOSx: NA  Referring Provider: Andreas Figueroa DO  [facility] [address] [facility]     Medical Diagnosis:   M25.511, G89.29 (ICD-10-CM) - Chronic right shoulder pain    M54.5, G89.29 (ICD-10-CM) - Chronic bilateral low back pain without sciatica    M79.18 (ICD-10-CM) - Cervical myofascial pain syndrome    M76.01 (ICD-10-CM) - Tendinopathy of right gluteal region        Outcome Measure:  Oswestry 26%, NDI 12%    S: pt reports no back pain, some hip soreness. Pt reports saw doc for followup and plan is to try chiropractic care next. O:  Time 1146 - 1224     Visit  Repeat outcome measure at mid point and end. Pain 0/10 in back   Pain in R hip.       ROM      Modalities      MH + ES            Exercise      ALL EXERCISE DONE WITH DRAW-IN TECHNIQUE                            Functional activities To aid in reaching , pushing, pulling tasks at home     ROWS: H  \"    ROWS: M  \"    ROWS: L  \"    Obliques - high  \"    Obliques - low  \"     THEREX     IT band stretch, R 3 x 20sAdd to HEP TE   Quad stretch R 3 x 20sAdd to HEP TE   Hamstring stretch  R 3 x 20sAdd to HEP TE   Modified and regular piriformis stretch  R 3 x 20s   TE         bike 10 minutes   TE   punches      Lat pulldowns      Triceps ext standing      Marching      Bridging with hip add with ball 2 x 10   TE   Lumbar rotations    TE   QL stretch  10 x 10s   TE   SKTC 2 x 10 x 5s B   TE   DKTC      Prone prop      Brace knee fall out       Quadruped bird dog       Hip abd with BTB 2 x 15      Brace bent leg alt lower     Dead bug  2  X 10 2 set of 10 alt LE only    SB marching      SB flexion stretch      Sidestep with punch out Cable system 15#    Prone pressups       PPT      A:  Tolerated well.     P: Continue with rehab plan  Keyur Rutherford, PT  PT DPT [id number]    Treatment Charges: Mins Units   Initial Evaluation     Re-Evaluation     Ther Exercise         TE 38  3   Manual Therapy     MT     Ther Activities        TA     Gait Training          GT     Neuro Re-education NR     Modalities     Non-Billable Service Time     Other     Total Time/Units 38  3

## 2020-12-14 ENCOUNTER — TREATMENT (OUTPATIENT)
Dept: PHYSICAL THERAPY | Age: 35
End: 2020-12-14
Payer: COMMERCIAL

## 2020-12-14 PROCEDURE — 97110 THERAPEUTIC EXERCISES: CPT | Performed by: PHYSICAL THERAPIST

## 2020-12-14 NOTE — PROGRESS NOTES
[address] [facility]   Phone: [phone]   Fax: [phone]      Physical Therapy Treatment Note    Date: 2020  Patient Name: Palomo Whitehead  : 1985   MRN: [id number]  DOInjury: back/hip - 1 year, neck - 6 months   DOSx: NA  Referring Provider: Qamar Gant DO  [facility] [address],  [address] [facility]     Medical Diagnosis:   M25.511, G89.29 (ICD-10-CM) - Chronic right shoulder pain    M54.5, G89.29 (ICD-10-CM) - Chronic bilateral low back pain without sciatica    M79.18 (ICD-10-CM) - Cervical myofascial pain syndrome    M76.01 (ICD-10-CM) - Tendinopathy of right gluteal region        Outcome Measure:  Oswestry 26%, NDI 12%    S: pt reports 3/10 back pain, 1/10 pain in R hip. O:  Time 1420 - 1458     Visit 10 / 12 Repeat outcome measure at mid point and end. Pain 3/10 in back   1/10 Pain in R hip. ROM      Modalities      MH + ES            Exercise      ALL EXERCISE DONE WITH DRAW-IN TECHNIQUE                            Functional activities To aid in reaching , pushing, pulling tasks at home     ROWS: H  \"    ROWS: M  \"    ROWS: L  \"    Obliques - high  \"    Obliques - low  \"     THEREX     IT band stretch, R 3 x 20sAdd to HEP TE   Quad stretch R 3 x 20sAdd to HEP TE   Hamstring stretch  R 3 x 20sAdd to HEP TE   Modified and regular piriformis stretch  R 3 x 20s   TE         bike 10 minutes   TE   punches      Lat pulldowns      Triceps ext standing      Marching      Bridging with hip add with ball 2 x 10   TE   Lumbar rotations    TE   QL stretch  10 x 10s   TE   SKTC   TE   DKTC      Prone prop      Brace knee fall out       Quadruped bird dog  1 x 10  First UE only, then LE only, then alt opp LE and UE  TE   Hip abd with BTB 2 x 15   TE   Brace bent leg alt lower     Dead bug  2  X 10 2 set of 10 alt LE only TE   SB marching      SB flexion stretch      Sidestep with punch out Cable system 15#    Prone pressups       PPT      A:  Tolerated well. P: Continue with rehab plan  Dwayne Haskins, PT  PT DPT [id number]    Treatment Charges: Mins Units   Initial Evaluation     Re-Evaluation     Ther Exercise         TE 38  3   Manual Therapy     MT     Ther Activities        TA     Gait Training          GT     Neuro Re-education NR     Modalities     Non-Billable Service Time     Other     Total Time/Units 38  3

## 2020-12-15 ENCOUNTER — OFFICE VISIT (OUTPATIENT)
Dept: CHIROPRACTIC MEDICINE | Age: 35
End: 2020-12-15
Payer: COMMERCIAL

## 2020-12-15 VITALS
BODY MASS INDEX: 26.37 KG/M2 | HEIGHT: 67 IN | SYSTOLIC BLOOD PRESSURE: 118 MMHG | RESPIRATION RATE: 16 BRPM | HEART RATE: 115 BPM | DIASTOLIC BLOOD PRESSURE: 76 MMHG | TEMPERATURE: 98.7 F | WEIGHT: 168 LBS | OXYGEN SATURATION: 97 %

## 2020-12-15 PROCEDURE — G0283 ELEC STIM OTHER THAN WOUND: HCPCS | Performed by: CHIROPRACTOR

## 2020-12-15 PROCEDURE — 1036F TOBACCO NON-USER: CPT | Performed by: CHIROPRACTOR

## 2020-12-15 PROCEDURE — G8427 DOCREV CUR MEDS BY ELIG CLIN: HCPCS | Performed by: CHIROPRACTOR

## 2020-12-15 PROCEDURE — G8484 FLU IMMUNIZE NO ADMIN: HCPCS | Performed by: CHIROPRACTOR

## 2020-12-15 PROCEDURE — 99203 OFFICE O/P NEW LOW 30 MIN: CPT | Performed by: CHIROPRACTOR

## 2020-12-15 PROCEDURE — G8419 CALC BMI OUT NRM PARAM NOF/U: HCPCS | Performed by: CHIROPRACTOR

## 2020-12-15 PROCEDURE — 98940 CHIROPRACT MANJ 1-2 REGIONS: CPT | Performed by: CHIROPRACTOR

## 2020-12-15 ASSESSMENT — ENCOUNTER SYMPTOMS
BOWEL INCONTINENCE: 0
BACK PAIN: 1
COUGH: 0
SHORTNESS OF BREATH: 0

## 2020-12-15 NOTE — PROGRESS NOTES
M[facility]X N [facility] CHIRO    12/15/20  Davida Fung : 1985 Sex: male  Age: 30 y.o. Patient was referred by Veronica Manuel DO    Chief Complaint   Patient presents with    Neck Pain    Lower Back Pain     Right sided lower back/hip pain; fell about a year agon on the hip. R hip, LBP -   1 year ago, Oct 2019 slipped on some tile in the home, landed on R hip. Prior chiropractic care, about 2 years ago. Back Pain  This is a chronic problem. The current episode started more than 1 year ago. The problem occurs constantly. The problem has been waxing and waning since onset. The pain is present in the sacro-iliac, lumbar spine and gluteal (right hip). The pain radiates to the right thigh. The pain is at a severity of 4/10. The symptoms are aggravated by bending (shoveling, raking, sit on hard floor). Stiffness is present in the morning. Associated symptoms include leg pain. Pertinent negatives include no bladder incontinence, bowel incontinence, fever, numbness, paresthesias or tingling. Treatments tried: PT, shot. The treatment provided moderate relief. Red Flags:  none    Review of Systems   Constitutional: Negative for chills and fever. Respiratory: Negative for cough and shortness of breath. Gastrointestinal: Negative for bowel incontinence. Genitourinary: Negative for bladder incontinence. Musculoskeletal: Positive for back pain. Neurological: Negative for tingling, numbness and paresthesias.          Current Outpatient Medications:     diclofenac sodium (VOLTAREN) 1 % GEL, apply 4 grams topically four times a day, Disp: 200 g, Rfl: 1    ELDERBERRY PO, Take by mouth, Disp: , Rfl:     cyclobenzaprine (FLEXERIL) 5 MG tablet, Take 1 tablet by mouth nightly as needed for Muscle spasms, Disp: 30 tablet, Rfl: 0    montelukast (SINGULAIR) 10 MG tablet, Take 1 tablet by mouth daily, Disp: 90 tablet, Rfl: 3   SUMAtriptan (IMITREX) 25 MG tablet, 1 tab PO at onset of headache. May repeat x1 dose in 2 hours if HA persists. Max of 2 pills per day PRN (Patient not taking: Reported on 8/31/2019), Disp: 9 tablet, Rfl: 5    Allergies   Allergen Reactions    Bee Venom     Cephalexin Diarrhea    Cephalosporins     Morphine      Violent         Past Medical History:   Diagnosis Date    Allergic rhinitis     Lactose intolerance      Family History   Problem Relation Age of Onset    Diabetes Mother     Other Father         pituitary tumor    Ovarian Cancer Maternal Grandmother     Cancer Maternal Grandfather     Lung Cancer Paternal Grandfather      Past Surgical History:   Procedure Laterality Date    ANTERIOR CRUCIATE LIGAMENT REPAIR      ECHOCARDIOGRAM EXERCISE STRESS TEST  11/6/2013         TONSILLECTOMY       Social History     Socioeconomic History    Marital status:      Spouse name: Not on file    Number of children: Not on file    Years of education: Not on file    Highest education level: Not on file   Occupational History    Occupation: Wastewater treatment   Social Needs    Financial resource strain: Not hard at all   Bright Things insecurity     Worry: Never true     Inability: Never true    Transportation needs     Medical: No     Non-medical: No   Tobacco Use    Smoking status: Never Smoker    Smokeless tobacco: Never Used   Substance and Sexual Activity    Alcohol use:  Yes     Alcohol/week: 4.0 standard drinks     Types: 4 Cans of beer per week     Comment: daily    Drug use: No    Sexual activity: Yes     Partners: Female   Lifestyle    Physical activity     Days per week: Not on file     Minutes per session: Not on file    Stress: Not on file   Relationships    Social connections     Talks on phone: Not on file     Gets together: Not on file     Attends Jewish service: Not on file     Active member of club or organization: Not on file Attends meetings of clubs or organizations: Not on file     Relationship status: Not on file    Intimate partner violence     Fear of current or ex partner: Not on file     Emotionally abused: Not on file     Physically abused: Not on file     Forced sexual activity: Not on file   Other Topics Concern    Not on file   Social History Narrative    Not on file       Vitals:    12/15/20 1327   BP: 118/76   Site: Left Upper Arm   Position: Sitting   Pulse: 115   Resp: 16   Temp: 98.7 °F (37.1 °C)   TempSrc: Temporal   SpO2: 97%   Weight: 168 lb (76.2 kg)   Height: 5' 7\" (1.702 m)       EXAM:  Appearance: alert, well appearing, and in no distress. Appears well. No acute distress. Oriented x3. Ambulates without difficulty. No antalgia or list.      Lumbar flexion: 50/60  Lumbar extension: 20/25  Right lateral flexion: 20/25  Left lateral flexion: 20/25    Reflexes are +2/5 and symmetrical at the Patella and Achilles. Manual muscle testing reveals: 5/5 strength to the LE indicator muscles. Sensation to light touch is WNL to the distal lower extremity dermatomes. Posterior tibial pulses 2/4 B/L. Plantar response reveals down-going toes b/l. Valsalva's: Negative   Seated SLR's: Negative Bilateral  Diaz's:  Negative Bilateral  Slump:  Negative Bilateral  Supine SLR:  Negative Bilateral  ASHLEY/Bernardo's test:  Positive Right Lower Extremity  Braggard's test:  Negative Bilateral  Prone Lying: Relieves  Prone Lying in extension: Increase/worse  Repeated Extensions in Lying x 10: Increase during loading/worse after loading  Repeated flexion in lying x10: Decrease during loading/better after loading    SI Compression: Negative  SI Distraction: Negative  Thigh Thrust: Negative bilateral  Sacral Thrust: Negative    Midline pain: Mild at L4-5 interspace  Taut and Tender fibers lumbar paraspinals B/L  Joint fixation at: Bilateral SI joints, L5-S1    Chris Mario was seen today for neck pain and lower back pain. Diagnoses and all orders for this visit:    Lumbar degenerative disc disease    Midline low back pain without sciatica, unspecified chronicity        Treatment Plan:   I spoke with him regarding my exam findings and treatment options. Review of his x-rays shows mild retrolisthesis L4-5. I recommended that we start a trial of conservative care today consisting manipulation, EMS and HEP. I will plan on seeing him 1 times per week for 4 weeks, then reassess to determine response to care and future options. With consent, I did begin today. Problem/Goals  Decrease pain and/or swelling and Increase ROM and/or flexibility    Today's Treatment  EMS with heat to the lumbosacral region for 15 minutes to address muscle spasm/hypertension and alleviate pain. Versed 5 manipulation to the affected segments. Tolerated well. I want him to try some repeated flexion in lying or repeated flexion in sitting x10, twice daily. He should stop should symptoms worsen. He is a good understanding. We will continue with physical therapy as well. I spoke to him regarding posttreatment soreness. Should any arise, he  may use ice for 10-15 minutes, over-the-counter NSAIDs or topical gels. Seen By:  Eduin Butler DC    * This note was created using voice recognition software.   The note was reviewed, however grammatical errors may exist.

## 2020-12-22 ENCOUNTER — TREATMENT (OUTPATIENT)
Dept: PHYSICAL THERAPY | Age: 35
End: 2020-12-22
Payer: COMMERCIAL

## 2020-12-22 PROCEDURE — 97110 THERAPEUTIC EXERCISES: CPT | Performed by: PHYSICAL THERAPIST

## 2020-12-22 NOTE — PROGRESS NOTES
[address] [facility]   Phone: [phone]   Fax: [phone]        Referring Provider: Fransisca Flores DO  [address] [facility],  [facility]     Medical Diagnosis:     {No diagnosis found. (Refresh or delete this SmartLink)}    CERTIFICATION PERIOD:  *** to ***    ATTENDANCE:  Patient has attended *** of *** scheduled treatments from ***  to ***. TREATMENTS RECEIVED:  ***    INITIAL STATUS:  · ***  · ***    CURRENT STATUS:  · ***  · ***    OUTCOME MEASURE:      COMMENTS AND RECOMMENDATIONS:   ***    Thank you for the opportunity to work with your patient. Kimberly Johno, PT    I CERTIFY THAT THE ABOVE REASSESSMENT AND PLAN OF CARE FOR PHYSICAL THERAPY SERVICES ARE APPROPRIATE AND MEDICALLY NECESSARY.     Duration: From 12/22/2020 thru ***    ________________________                _______________  Physician     Date

## 2020-12-22 NOTE — PROGRESS NOTES
[address] [facility]   Phone: [phone]   Fax: [phone]      Physical Therapy Treatment Note    Date: 2020  Patient Name: Aby Shepard  : 1985   MRN: [id number]  [facility]: back/hip - 1 year, neck - 6 months   DOSx: NA  Referring Provider: Marilu Galeazzi, DO  [address],  [address] [facility]     Medical Diagnosis:   M25.511, G89.29 (ICD-10-CM) - Chronic right shoulder pain    M54.5, G89.29 (ICD-10-CM) - Chronic bilateral low back pain without sciatica    M79.18 (ICD-10-CM) - Cervical myofascial pain syndrome    M76.01 (ICD-10-CM) - Tendinopathy of right gluteal region        Outcome Measure:  Oswestry 26%, NDI 12%    S: pt reports 3/10 back pain, 3/10 newer pain in R anterior hip. Pt late to session; reports thought session was at 1030. Pt reports does his HEP when he has pain and they help relieve some pain. O:  Time 1028 - 1058      Visit  Repeat outcome measure at mid point and end.     Pain 3/10 in back and R hip anterior       ROM      Modalities      MH + ES            Exercise      ALL EXERCISE DONE WITH DRAW-IN TECHNIQUE                            Functional activities To aid in reaching , pushing, pulling tasks at home     ROWS: H  \"    ROWS: M  \"    ROWS: L  \"    Obliques - high  \"    Obliques - low  \"     THEREX     IT band stretch, R 3 x 20sAdd to HEP TE   Quad stretch R 3 x 20sAdd to HEP TE   Hamstring stretch  R 3 x 20sAdd to HEP TE   Modified and regular piriformis stretch  R 3 x 20s   TE         bike 10 minutes   TE   punches      Lat pulldowns      Triceps ext standing      Marching      Bridging with hip add with ball 2 x 10   TE   Lumbar rotations    TE   QL stretch    TE   SKTC   TE   DKTC 2 x 10 x 5s      Prone prop      Brace knee fall out       Quadruped bird dog  First UE only, then LE only, then alt opp LE and UE  TE   Hip abd with BTB  TE   Brace bent leg alt lower     Dead bug  2 set of 10 alt LE only TE   SB marching SB flexion stretch      Sidestep with punch out Cable system 15#    Prone pressups       PPT      A:  Tolerated well. Pt reports anterior R hip pain noted more with DKTC. Discussed with pt recommend try quad stretch prior to KTC exercises when he does them at home and let therapist know next time if that helps. Pt reports understanding.      P: Continue with rehab plan  Pascual Abraham, PT  PT DPT [id number]    Treatment Charges: Mins Units   Initial Evaluation     Re-Evaluation     Ther Exercise         TE 30  2   Manual Therapy     MT     Ther Activities        TA     Gait Training          GT     Neuro Re-education NR     Modalities     Non-Billable Service Time     Other     Total Time/Units 30 2

## 2020-12-28 ENCOUNTER — TELEPHONE (OUTPATIENT)
Dept: PHYSICAL MEDICINE AND REHAB | Age: 35
End: 2020-12-28

## 2020-12-29 ENCOUNTER — TREATMENT (OUTPATIENT)
Dept: PHYSICAL THERAPY | Age: 35
End: 2020-12-29
Payer: COMMERCIAL

## 2020-12-29 PROCEDURE — 97110 THERAPEUTIC EXERCISES: CPT | Performed by: PHYSICAL THERAPIST

## 2020-12-29 PROCEDURE — 97164 PT RE-EVAL EST PLAN CARE: CPT | Performed by: PHYSICAL THERAPIST

## 2020-12-29 NOTE — PROGRESS NOTES
[address] [place] [address] and Rehabilitation   Phone: [phone]   Fax: [phone]        Referring Provider: Elyssa Iniguez DO  [address] [facility],  [facility]     Medical Diagnosis:   M25.511, G89.29 (ICD-10-CM) - Chronic right shoulder pain    M54.5, G89.29 (ICD-10-CM) - Chronic bilateral low back pain without sciatica    M79.18 (ICD-10-CM) - Cervical myofascial pain syndrome    M76.01 (ICD-10-CM) - Tendinopathy of right gluteal region           CERTIFICATION PERIOD:  10/19/2020  to 12/4/2020. ATTENDANCE:  Patient has attended 15 of 18 scheduled treatments from 10/19/2020  to 12/29/2020. TREATMENTS RECEIVED:  Therapeutic exercise    INITIAL STATUS:  Observations: well nourished male     Gait: normal     Functional Strength:   Able to toe walk, heel walk, and squat.     Range of Motion:     Neck:               Flexion:                       [x]? Normal   []? Limited               Extension:                   [x]? Normal   []? Limited                Right Rotation:            [x]? Normal   []? Limited               Left Rotation:               [x]? Normal   []? Limited               Right Side Bending:    [x]? Normal   []? Limited tightness felt per pt              Left Side Bending:      [x]? Normal   []? Limited tightness felt per pt      Upper Extremity:              Right:                           [x]? Normal   []? Limited               Left:                             [x]? Normal   []? Limited      Trunk:               Flexion:                       []? Normal   [x]? Limited 10%              Extension:                   []? Normal   [x]? Limited 20%              Right Rotation:                        [x]? Normal   []? Limited               Left Rotation:               [x]? Normal   []? Limited               Right Side Bending:    [x]? Normal   []? Limited min pain with               Left Side Bending:      [x]? Normal   []?  Limited min pain with      Lower Extremity: Right []?+ / []? -    Left []?+ / []? -      []? SLR           Right []?+ / [x]? -    Left []?+ / [x]? -     []? ASHLEY          Right []?+ / [x]? -    Left []?+ / [x]? -  []? S-I Compression          Right []?+ / []? -    Left []?+ / []? -   []? Other:pelvic alignment []?+ / [x]? -                Special Tests:   []? Nerve Root Compression           Right []?+ / []? -    Left []?+ / []? -  []? Cervical Distraction []?+ / []? -     []? Spurling's Test           Right []?+ / [x]? -    Left []?+ / [x]? -     []? Flexion/rotation test :           Right []?+ / [x]? -    Left []?+ / [x]? -    []? Other: []?+ / []? -            Short Term goals (3 weeks)  · Decrease reported pain to 0-4/10 (goal met)  · Increase ROM by 10% (goal met)  · Increase Strength to 5-/5 (goal met)  · Able to perform/complete the following functions/tasks: pt will be able to stand 45 minutes with minor pain/limitation. Pt will be able to sit 1.5 hours with minor pain/limitation. Pt will be able to bend and lift 10 pounds from the floor five times with minor pain/limitation.  (partial goal met)   · Oswestry Low Back Disability Questionnaire 20% disability (goal met)   · Neck Disability Index (NDI) 8% (goal met)      Long Term goals (6 weeks)  · Decrease reported pain to 0-3/10 (goal met)   · Increase ROM to WNL (goal met)   · Increase Strength to 5/5 (goal met)   · Able to perform/complete the following functions/tasks: pt will be able to stand 1 hour with no pain/limitation. Pt will be able to sit 2 hours with no pain/limitation.   Pt will be able to bend and lift 20 pounds from the floor five times with no pain/limitation.  (partial goal met)   · Oswestry Low Back Disability Questionnaire 15% disability (goal met)   · Neck Disability Index (NDI) 5% (not met)   · Independent with Home Exercise Programs (goal met)     OUTCOME MEASURE:   Modified Oswestry 14% disability, Neck Disability Index 6% disability     COMMENTS AND RECOMMENDATIONS:   Pt reports feels approximately 50% better than initial evaluation. Pt reports feels that pain is more manageable with the exercises. Pt reports that he currently feels that his injection that he got from doctor is now starting to wear off and that he follows up with doctor next month. Pt reports feels he would like to continue with HEP independently at home until he follows up with doctor and then return for more therapy pending doctor recommendation. Pt chose not to address shoulder or neck during sessions due to majority of symptoms during this period related to back and hip. Recommend pt continue with HEP at home and call with any questions. Thank you for the opportunity to work with your patient. Bessy Joy, PT DPT [id number]    I CERTIFY THAT THE ABOVE REASSESSMENT AND PLAN OF CARE FOR PHYSICAL THERAPY SERVICES ARE APPROPRIATE AND MEDICALLY NECESSARY.         ________________________                _______________  Physician     Date

## 2020-12-29 NOTE — PROGRESS NOTES
[address] [facility]   Phone: [phone]   Fax: [phone]      Physical Therapy Treatment Note    Date: 2020  Patient Name: Mervin Hudson  : 1985   MRN: [id number]  DOInjury: back/hip - 1 year, neck - 6 months   DOSx: NA  Referring Provider: Lenny Hartley DO  [address] [place] [address]     Medical Diagnosis:   M25.511, G89.29 (ICD-10-CM) - Chronic right shoulder pain    M54.5, G89.29 (ICD-10-CM) - Chronic bilateral low back pain without sciatica    M79.18 (ICD-10-CM) - Cervical myofascial pain syndrome    M76.01 (ICD-10-CM) - Tendinopathy of right gluteal region        Outcome Measure:  Oswestry 14%, NDI 6%    S: pt reports had hip and back pain over [place] but no pain today. O:  Time 0800- 0845     Visit  Repeat outcome measure at mid point and end.     Pain 0/10        ROM      Modalities      MH + ES            Exercise      ALL EXERCISE DONE WITH DRAW-IN TECHNIQUE                            Functional activities To aid in reaching , pushing, pulling tasks at home     ROWS: H  \"    ROWS: M  \"    ROWS: L  \"    Obliques - high  \"    Obliques - low  \"     THEREX     IT band stretch, Add to HEP TE   Quad stretch Add to HEP TE   Hamstring stretch  Add to HEP TE   Modified and regular piriformis stretch   TE         bike 10 minutes   TE   punches      Lat pulldowns      Triceps ext standing      Marching      Bridging with hip add with ball  TE   Lumbar rotations   TE   QL stretch  10 x 10s  TE   SKTC 2 x 10 x 5s B  TE   DKTC     Prone prop     Brace knee fall out      Quadruped bird dog  First UE only, then LE only, then alt opp LE and UE  TE   Hip abd with BTB  TE   Brace bent leg alt lower     Dead bug  2 set of 10 alt LE only TE   SB marching      SB flexion stretch      Sidestep with punch out Cable system 15#    Prone pressups      PPT A:  Tolerated well. Reassessment completed today. See note for details. Pt reports no questions at this time. Pt instructed to call with any questions. P: Will discharge pt at this time.    Campbell Hein, PT  PT DPT [id number]    Treatment Charges: Mins Units   Initial Evaluation     Re-Evaluation 30 1   Ther Exercise         TE 15 1   Manual Therapy     MT     Ther Activities        TA     Gait Training          GT     Neuro Re-education NR     Modalities     Non-Billable Service Time     Other     Total Time/Units 45 2

## 2021-01-18 ENCOUNTER — OFFICE VISIT (OUTPATIENT)
Dept: FAMILY MEDICINE CLINIC | Age: 36
End: 2021-01-18
Payer: COMMERCIAL

## 2021-01-18 VITALS
TEMPERATURE: 97.7 F | DIASTOLIC BLOOD PRESSURE: 85 MMHG | OXYGEN SATURATION: 100 % | HEART RATE: 77 BPM | WEIGHT: 184.5 LBS | RESPIRATION RATE: 16 BRPM | SYSTOLIC BLOOD PRESSURE: 138 MMHG | HEIGHT: 67 IN | BODY MASS INDEX: 28.96 KG/M2

## 2021-01-18 DIAGNOSIS — Z00.00 ANNUAL PHYSICAL EXAM: Primary | ICD-10-CM

## 2021-01-18 DIAGNOSIS — J30.9 ALLERGIC RHINITIS, UNSPECIFIED SEASONALITY, UNSPECIFIED TRIGGER: ICD-10-CM

## 2021-01-18 DIAGNOSIS — E55.9 VITAMIN D DEFICIENCY: ICD-10-CM

## 2021-01-18 PROCEDURE — G8484 FLU IMMUNIZE NO ADMIN: HCPCS | Performed by: FAMILY MEDICINE

## 2021-01-18 PROCEDURE — 99395 PREV VISIT EST AGE 18-39: CPT | Performed by: FAMILY MEDICINE

## 2021-01-18 RX ORDER — MONTELUKAST SODIUM 10 MG/1
10 TABLET ORAL DAILY
Qty: 90 TABLET | Refills: 3 | Status: SHIPPED
Start: 2021-01-18 | End: 2022-01-19 | Stop reason: SDUPTHER

## 2021-01-18 ASSESSMENT — PATIENT HEALTH QUESTIONNAIRE - PHQ9
SUM OF ALL RESPONSES TO PHQ9 QUESTIONS 1 & 2: 0
SUM OF ALL RESPONSES TO PHQ QUESTIONS 1-9: 0

## 2021-01-18 NOTE — PROGRESS NOTES
1/18/2021    Chief Complaint   Patient presents with    Annual Exam       HPI    Carmelo Grady is a 29 y.o. patient that presents today for:    Screening Questions:   ? Cholesterol every 5 years Yes.  ? Exercise No.  ? Joint pain No.  ? Personal or family history Prostate Cancer or Colon Cancer No.  ? Difficulty with urine: No.    Stream strength     Flow rate    Nocturia  ? PSA/Urology No.  ? CIBH No.  ? Colon Cancer screening:  yes  ? Eye exam every 24 years, yearly if diabetic  Yes. ? Body mass index is 28.9 kg/m². counseled on weight loss Yes. Counseled on weight loss  ? Dental Exam Yes.  ? Depression No.  ? Tobacco use. No.  Cessation discussed  No.  ? AAA screening No.  ? Alcohol use/CAGE No.      Immunizations  Immunization status:   ? Tetanus shot every 10 years   ? Flu shot yearly  ? Pneumovax shot once for high risk and everyone > 65  ? Shingles shot once for everyone > 60   ? Hepatitis B for high risk patients      Patient's past medical, surgical, social and/or family history reviewed, updated in chart, and are non-contributory (unless otherwise stated). Medications and allergies also reviewed and updated in chart. ROS negative unless otherwise specified    Physical Exam  Temp Readings from Last 3 Encounters:   01/18/21 97.7 °F (36.5 °C) (Temporal)   12/15/20 98.7 °F (37.1 °C) (Temporal)   12/09/20 97.3 °F (36.3 °C)     Wt Readings from Last 3 Encounters:   01/18/21 184 lb 8 oz (83.7 kg)   12/15/20 168 lb (76.2 kg)   12/09/20 168 lb (76.2 kg)     BP Readings from Last 3 Encounters:   01/18/21 138/85   12/15/20 118/76   12/09/20 116/80     Pulse Readings from Last 3 Encounters:   01/18/21 77   12/15/20 115   10/09/20 96       General appearance: alert, well appearing, and in no distress, oriented to person, place, and time and normal appearing weight. CVS exam: normal rate, regular rhythm, normal S1, S2, no murmurs, rubs, clicks or gallops. Radial pulses 2+ bilateral.  PT/DP pulse 2+ bilat. No C/C/E    Chest: clear to auscultation, no wheezes, rales or rhonchi, symmetric air entry. Abdomen: Soft, non-tender, non-distended, positive BS in all 4 quadrants    Extremities:Dorsalis pedis pulses palpated bilaterally, no clubbing, cyanosis, edema or erythema,     SKIN: no lesions, jaundice, petechiae, pallor, cyanosis, ecchymosis    NEURO: gross motor exam normal by observation, gait normal    Mental status - alert, oriented to person, place, and time, normal mood, behavior, speech, dress, motor activity, and thought processes      Assessment/Plan  Lea Nelson was seen today for annual exam.    Diagnoses and all orders for this visit:    Annual physical exam  -     CBC; Future  -     Comprehensive Metabolic Panel; Future  -     Lipid Panel; Future  -     TSH without Reflex; Future    Allergic rhinitis, unspecified seasonality, unspecified trigger  -     montelukast (SINGULAIR) 10 MG tablet; Take 1 tablet by mouth daily          Return in about 1 year (around 1/18/2022). Linette Krishnamurthy, DO    Call or go to ED immediately if symptoms worsen or persist.    Educational materials and/or home exercises printed for patient's review and were included in patient instructions on his/her After Visit Summary and given to patient at the end of visit. Counseled regarding above diagnosis, including possible risks and complications,  especially if left uncontrolled. Counseled regarding the possible side effects, risks, benefits and alternatives to treatment; patient and/or guardian verbalizes understanding, agrees, feels comfortable with and wishes to proceed with above treatment plan. Advised patient to call with any new medication issues, and read all Rx info from pharmacy to assure aware of all possible risks and side effects of medication before taking. Reviewed age and gender appropriate health screening exams and vaccinations. Advised patient regarding importance of keeping up with recommended health maintenance and to schedule as soon as possible if overdue, as this is important in assessing for undiagnosed pathology, especially cancer, as well as protecting against potentially harmful/life threatening disease. Patient and/or guardian verbalizes understanding and agrees with above counseling, assessment and plan. All questions answered.

## 2021-01-29 ENCOUNTER — HOSPITAL ENCOUNTER (OUTPATIENT)
Age: 36
Discharge: HOME | End: 2021-01-29
Payer: COMMERCIAL

## 2021-01-29 DIAGNOSIS — E55.9 VITAMIN D DEFICIENCY: ICD-10-CM

## 2021-01-29 DIAGNOSIS — Z00.00 ANNUAL PHYSICAL EXAM: ICD-10-CM

## 2021-01-29 LAB
ALBUMIN SERPL-MCNC: 4.5 G/DL (ref 3.5–5.2)
ALP BLD-CCNC: 50 U/L (ref 40–129)
ALT SERPL-CCNC: 29 U/L (ref 0–40)
ANION GAP SERPL CALCULATED.3IONS-SCNC: 7 MMOL/L (ref 7–16)
AST SERPL-CCNC: 19 U/L (ref 0–39)
BILIRUB SERPL-MCNC: 1.2 MG/DL (ref 0–1.2)
BUN BLDV-MCNC: 14 MG/DL (ref 6–20)
CALCIUM SERPL-MCNC: 9.4 MG/DL (ref 8.6–10.2)
CHLORIDE BLD-SCNC: 103 MMOL/L (ref 98–107)
CHOLESTEROL, TOTAL: 129 MG/DL (ref 0–199)
CO2: 29 MMOL/L (ref 22–29)
CREAT SERPL-MCNC: 1.2 MG/DL (ref 0.7–1.2)
GFR AFRICAN AMERICAN: >60
GFR NON-AFRICAN AMERICAN: >60 ML/MIN/1.73
GLUCOSE BLD-MCNC: 99 MG/DL (ref 74–99)
HCT VFR BLD CALC: 45.4 % (ref 37–54)
HDLC SERPL-MCNC: 50 MG/DL
HEMOGLOBIN: 14.8 G/DL (ref 12.5–16.5)
LDL CHOLESTEROL CALCULATED: 68 MG/DL (ref 0–99)
MCH RBC QN AUTO: 29.7 PG (ref 26–35)
MCHC RBC AUTO-ENTMCNC: 32.6 % (ref 32–34.5)
MCV RBC AUTO: 91 FL (ref 80–99.9)
PDW BLD-RTO: 12.1 FL (ref 11.5–15)
PLATELET # BLD: 231 E9/L (ref 130–450)
PMV BLD AUTO: 10.3 FL (ref 7–12)
POTASSIUM SERPL-SCNC: 4.2 MMOL/L (ref 3.5–5)
RBC # BLD: 4.99 E12/L (ref 3.8–5.8)
SODIUM BLD-SCNC: 139 MMOL/L (ref 132–146)
TOTAL PROTEIN: 7 G/DL (ref 6.4–8.3)
TRIGL SERPL-MCNC: 55 MG/DL (ref 0–149)
TSH SERPL DL<=0.05 MIU/L-ACNC: 1.57 UIU/ML (ref 0.27–4.2)
VITAMIN D 25-HYDROXY: 25 NG/ML (ref 30–100)
VLDLC SERPL CALC-MCNC: 11 MG/DL
WBC # BLD: 5.6 E9/L (ref 4.5–11.5)

## 2021-01-29 PROCEDURE — 82306 VITAMIN D 25 HYDROXY: CPT

## 2021-01-29 PROCEDURE — 36415 COLL VENOUS BLD VENIPUNCTURE: CPT

## 2021-01-29 PROCEDURE — 84443 ASSAY THYROID STIM HORMONE: CPT

## 2021-01-29 PROCEDURE — 80053 COMPREHEN METABOLIC PANEL: CPT

## 2021-01-29 PROCEDURE — 80061 LIPID PANEL: CPT

## 2021-01-29 PROCEDURE — 85027 COMPLETE CBC AUTOMATED: CPT

## 2021-05-19 ENCOUNTER — OFFICE VISIT (OUTPATIENT)
Dept: FAMILY MEDICINE CLINIC | Age: 36
End: 2021-05-19
Payer: COMMERCIAL

## 2021-05-19 VITALS
BODY MASS INDEX: 29.19 KG/M2 | HEIGHT: 67 IN | DIASTOLIC BLOOD PRESSURE: 80 MMHG | TEMPERATURE: 98.5 F | WEIGHT: 186 LBS | HEART RATE: 82 BPM | RESPIRATION RATE: 18 BRPM | OXYGEN SATURATION: 98 % | SYSTOLIC BLOOD PRESSURE: 118 MMHG

## 2021-05-19 DIAGNOSIS — M72.2 PLANTAR FASCIITIS, BILATERAL: Primary | ICD-10-CM

## 2021-05-19 PROCEDURE — 99213 OFFICE O/P EST LOW 20 MIN: CPT | Performed by: FAMILY MEDICINE

## 2021-05-19 PROCEDURE — G8419 CALC BMI OUT NRM PARAM NOF/U: HCPCS | Performed by: FAMILY MEDICINE

## 2021-05-19 PROCEDURE — G8427 DOCREV CUR MEDS BY ELIG CLIN: HCPCS | Performed by: FAMILY MEDICINE

## 2021-05-19 PROCEDURE — 1036F TOBACCO NON-USER: CPT | Performed by: FAMILY MEDICINE

## 2021-05-19 NOTE — PROGRESS NOTES
5/20/2021    Chief Complaint   Patient presents with    Toe Pain     feet pain hes good when he has boots or shoes on but when he doesnt he can barely walk        HPI    Nayeli Renee is a 29 y.o. patient that presents today for:     Foot Pain: Patient complains of foot pain bilaterally. The patient has had pain in bilateral medial malleolus, heel. Onset was many weeks ago. Inciting event: none known. Pain is worse in AM and better with support of boots. Current symptoms include pain located globally and tingling. Pain is aggravated by any weight bearing. Imaging to date No .. Patient is not seeing Podiatry. Believes it is plantar fasciitis. Patient's past medical, surgical, social and/or family history reviewed, updated in chart, and are non-contributory (unless otherwise stated). Medications and allergies also reviewed and updated in chart. ROS negative unless otherwise specified    Physical Exam  /80   Pulse 82   Temp 98.5 °F (36.9 °C)   Resp 18   Ht 5' 7\" (1.702 m)   Wt 186 lb (84.4 kg)   SpO2 98%   BMI 29.13 kg/m²   Wt Readings from Last 3 Encounters:   05/19/21 186 lb (84.4 kg)   01/18/21 184 lb 8 oz (83.7 kg)   12/15/20 168 lb (76.2 kg)     Temp Readings from Last 3 Encounters:   05/19/21 98.5 °F (36.9 °C)   01/18/21 97.7 °F (36.5 °C) (Temporal)   12/15/20 98.7 °F (37.1 °C) (Temporal)     BP Readings from Last 3 Encounters:   05/19/21 118/80   01/18/21 138/85   12/15/20 118/76     Pulse Readings from Last 3 Encounters:   05/19/21 82   01/18/21 77   12/15/20 115       General appearance: alert, well appearing, and in no distress, oriented to person, place, and time and normal appearing weight. CVS exam: normal rate, regular rhythm, normal S1, S2, no murmurs, rubs, clicks or gallops. Radial pulses 2+ bilateral.  PT/DP pulse 2+ bilat. No C/C/E    Chest: clear to auscultation, no wheezes, rales or rhonchi, symmetric air entry.      Musculoskeletal: MS 5/5, DTR 2/4 x4 extremities, FROM F/E spine, no tenderness, obvious masses or deformities. Gait normal.     Abdomen: Soft, non-tender, non-distended, positive BS in all 4 quadrants    Extremities:Dorsalis pedis pulses palpated bilaterally, no clubbing, cyanosis, edema or erythema     SKIN: no lesions, jaundice, petechiae, pallor, cyanosis, ecchymosis    NEURO: gross motor exam normal by observation, gait normal      Assessment/Plan  Alf Power was seen today for toe pain. Diagnoses and all orders for this visit:    Plantar fasciitis, bilateral  -     Ruby De Los Santos Budbrenda, DPM, Podiatry, [place]          No follow-ups on file. Linette Krishnamurthy, DO    Call or go to ED immediately if symptoms worsen or persist.    Educational materials and/or home exercises printed for patient's review and were included in patient instructions on his/her After Visit Summary and given to patient at the end of visit. Counseled regarding above diagnosis, including possible risks and complications,  especially if left uncontrolled. Counseled regarding the possible side effects, risks, benefits and alternatives to treatment; patient and/or guardian verbalizes understanding, agrees, feels comfortable with and wishes to proceed with above treatment plan. Advised patient to call with any new medication issues, and read all Rx info from pharmacy to assure aware of all possible risks and side effects of medication before taking. Reviewed age and gender appropriate health screening exams and vaccinations. Advised patient regarding importance of keeping up with recommended health maintenance and to schedule as soon as possible if overdue, as this is important in assessing for undiagnosed pathology, especially cancer, as well as protecting against potentially harmful/life threatening disease. Patient and/or guardian verbalizes understanding and agrees with above counseling, assessment and plan. All questions answered.

## 2021-05-19 NOTE — PATIENT INSTRUCTIONS
Patient Education        Plantar Fasciitis: Exercises  Introduction  Here are some examples of exercises for you to try. The exercises may be suggested for a condition or for rehabilitation. Start each exercise slowly. Ease off the exercises if you start to have pain. You will be told when to start these exercises and which ones will work best for you. How to do the exercises  Towel stretch   1. Sit with your legs extended and knees straight. 2. Place a towel around your foot just under the toes. 3. Hold each end of the towel in each hand, with your hands above your knees. 4. Pull back with the towel so that your foot stretches toward you. 5. Hold the position for at least 15 to 30 seconds. 6. Repeat 2 to 4 times a session, up to 5 sessions a day. Calf stretch   This exercise stretches the muscles at the back of the lower leg (the calf) and the Achilles tendon. Do this exercise 3 or 4 times a day, 5 days a week. 1. Stand facing a wall with your hands on the wall at about eye level. Put the leg you want to stretch about a step behind your other leg. 2. Keeping your back heel on the floor, bend your front knee until you feel a stretch in the back leg. 3. Hold the stretch for 15 to 30 seconds. Repeat 2 to 4 times. Plantar fascia and calf stretch   Stretching the plantar fascia and calf muscles can increase flexibility and decrease heel pain. You can do this exercise several times each day and before and after activity. 1. Stand on a step as shown above. Be sure to hold on to the banister. 2. Slowly let your heels down over the edge of the step as you relax your calf muscles. You should feel a gentle stretch across the bottom of your foot and up the back of your leg to your knee. 3. Hold the stretch about 15 to 30 seconds, and then tighten your calf muscle a little to bring your heel back up to the level of the step. Repeat 2 to 4 times.     Towel curls   Make this exercise more challenging by placing a weighted object, such as a soup can, on the other end of the towel. 1. While sitting, place your foot on a towel on the floor and scrunch the towel toward you with your toes. 2. Then, also using your toes, push the towel away from you. [place] pickups   1. Put marbles on the floor next to a cup.  2. Using your toes, try to lift the marbles up from the floor and put them in the cup. Follow-up care is a key part of your treatment and safety. Be sure to make and go to all appointments, and call your doctor if you are having problems. It's also a good idea to know your test results and keep a list of the medicines you take. Where can you learn more? Go to https://[employer]. org and sign in to your [employer] account. Enter A118 in the eelusion box to learn more about \"Plantar Fasciitis: Exercises. \"     If you do not have an account, please click on the \"Sign Up Now\" link. Current as of: November 16, 2020               Content Version: 12.8  © 3811-7640 Healthwise, Incorporated. Care instructions adapted under license by [facility] ([facility]). If you have questions about a medical condition or this instruction, always ask your healthcare professional. [address] any warranty or liability for your use of this information.

## 2021-06-01 ENCOUNTER — OFFICE VISIT (OUTPATIENT)
Dept: PODIATRY | Age: 36
End: 2021-06-01
Payer: COMMERCIAL

## 2021-06-01 VITALS — WEIGHT: 175 LBS | BODY MASS INDEX: 27.47 KG/M2 | HEIGHT: 67 IN

## 2021-06-01 DIAGNOSIS — M79.671 BILATERAL FOOT PAIN: Primary | ICD-10-CM

## 2021-06-01 DIAGNOSIS — M77.42 METATARSALGIA, LEFT FOOT: ICD-10-CM

## 2021-06-01 DIAGNOSIS — M79.672 BILATERAL FOOT PAIN: Primary | ICD-10-CM

## 2021-06-01 DIAGNOSIS — M72.2 PLANTAR FASCIAL FIBROMATOSIS: ICD-10-CM

## 2021-06-01 PROCEDURE — G8419 CALC BMI OUT NRM PARAM NOF/U: HCPCS | Performed by: PODIATRIST

## 2021-06-01 PROCEDURE — G8427 DOCREV CUR MEDS BY ELIG CLIN: HCPCS | Performed by: PODIATRIST

## 2021-06-01 PROCEDURE — 1036F TOBACCO NON-USER: CPT | Performed by: PODIATRIST

## 2021-06-01 PROCEDURE — 99203 OFFICE O/P NEW LOW 30 MIN: CPT | Performed by: PODIATRIST

## 2021-06-01 RX ORDER — MELOXICAM 15 MG/1
15 TABLET ORAL DAILY
Qty: 30 TABLET | Refills: 1 | Status: SHIPPED
Start: 2021-06-01 | End: 2021-07-26

## 2021-06-01 NOTE — LETTER
Shanna Sanchez DO   [address] [facility]  [address] [place] [address]     Patient: Apollo Fernandez  YOB: 1985  Date of Visit: 6/2/2021     Dear Shanna Sanchez DO    Thank you for referring Apollo Fernandez to me for evaluation. Yury Connolly seen today bilateral plantar fasciitis and metatarsalgia left foot. Radiographs were reviewed. Did recommend oral Mobic. Did discuss over-the-counter power step orthotics. I will follow-up 1 month if no significant improvement we did discuss cortisone injection at follow-up. Once again, thank you very much for this kind referral and allowing me to participate in the care of this patient. If you have questions, please do not hesitate to call me.     Sincerely,        DANNY Quispe [place] [id number]  Phone: [phone]  Fax: [phone]

## 2021-06-01 NOTE — PROGRESS NOTES
New patient in office referred by PCP Dr Doris Moore for bilat foot pain. Sx x 4 weeks. Right foot hurts mostly in heel. Left foot pain in ball of foot. Denies any injury. Xrays obtained prior to apt. Nargisradha Kinjal : 1985 Sex: male  Age: 29 y.o. Patient was referred by Karoline Ahn DO    CC:    Pain on the bottom both heels    HPI:   This pleasant 58-year-old male patient referred me today pain in the bottom both heels. Pain worse in the morning with first few steps. Has had symptoms for the past 4 months. Does wear mostly a work boot and stands on hard floor all day. Pain worse is on the left foot. Does have pain with first few steps in the morning has noticed some improvement throughout the day. Denies current anti-inflammatories. Did have radiographs taken today. No calf pain. No recent change in shoe gear. No current over-the-counter or custom orthotics. No additional pedal complaints at this time. ROS:  Const: Denies constitutional symptoms  Musculo: Denies symptoms other than stated above  Skin: Denies symptoms other than stated above       Current Outpatient Medications:     meloxicam (MOBIC) 15 MG tablet, Take 1 tablet by mouth daily for 30 doses, Disp: 30 tablet, Rfl: 1    montelukast (SINGULAIR) 10 MG tablet, Take 1 tablet by mouth daily, Disp: 90 tablet, Rfl: 3    diclofenac sodium (VOLTAREN) 1 % GEL, apply 4 grams topically four times a day, Disp: 200 g, Rfl: 1    ELDERBERRY PO, Take by mouth, Disp: , Rfl:   Allergies   Allergen Reactions    Bee Venom     Cephalexin Diarrhea    Cephalosporins     Morphine      Violent         Past Medical History:   Diagnosis Date    Allergic rhinitis     Lactose intolerance            Vitals:    21 0950   Weight: 175 lb (79.4 kg)   Height: 5' 7\" (1.702 m)       Work History/Social History:    Foot and ankle history:     Focused Lower Extremity Physical Exam:    Neurovascular examination:    Dorsalis Pedis palpable bilateral.  Posterior tibialis palpable bilateral.    Capillary Refill Time:  Immediate return  Hair growth:  Symmetrical and bilateral   Skin:  Not atrophic  Edema: No edema bilateral feet or ankles. Neurologic:  Light touch intact bilateral.      Musculoskeletal/ Orthopedic examination:    Equinis: Absent bilateral  Dorsiflexion, plantarflexion, inversion, eversion bilateral 5 out of 5 muscle strength  Wiggling toes  Negative Homans  Mild tenderness to palpation plantar second third and fourth metatarsal left foot. Tender to palpation medial band bilateral plantar fascia. Tenderness with windlass mechanism medial band plantar fascial.  Negative calcaneal squeeze test.  Negative Burger    Dermatology examination:    No open skin lesions or abrasions bilateral lower extremity. Assessment and Plan:  Lorenzo Monroy was seen today for foot pain. Diagnoses and all orders for this visit:    Bilateral foot pain  -     XR FOOT LEFT (MIN 3 VIEWS); Future  -     XR FOOT RIGHT (MIN 3 VIEWS); Future    Plantar fascial fibromatosis    Metatarsalgia, left foot    Other orders  -     meloxicam (MOBIC) 15 MG tablet; Take 1 tablet by mouth daily for 30 doses      Neil seen today bilateral plantar fasciitis and metatarsalgia left foot    Radiographs reviewed three-view bilateral foot. No acute fracture dislocation noted. No foreign body noted. Mobic 15 mg take once daily as directed. Risk and benefits of anti-inflammatories discussed in detail. Plantar fasciitis is a condition involving inflammation and pain to the heel. I did recommend passive and active range of motion stretches and strengthening of both the Achilles tendon and plantar fascia  Ice 10 minutes each night on the bottom of the plantar fascia  Avoid barefoot  Continue supportive walking shoe with well supported insert  I did recommend Powerstep Original full-length over-the-counter orthotics.     I did discuss cortisone injection bilateral plantar fascia in 1 month if no significant improvement. Return in about 1 month (around 7/1/2021). Seen By:  Antonio Robbins DPM      Document was created using voice recognition software. Note was reviewed, however may contain grammatical errors.

## 2021-06-01 NOTE — LETTER
6/1/2021        I saw Nandini Kenney appointment today. Please excuse from work 6/1/2021. Call anytime with any questions or concerns.         Sincerely,  Michelle Callahan DPJONATHAN      [address]  [address] [place], [facility] [address]  Phone: [phone]  Fax: [phone]

## 2021-07-26 RX ORDER — MELOXICAM 15 MG/1
TABLET ORAL
Qty: 30 TABLET | Refills: 1 | Status: SHIPPED
Start: 2021-07-26 | End: 2021-09-21

## 2021-09-21 RX ORDER — MELOXICAM 15 MG/1
TABLET ORAL
Qty: 30 TABLET | Refills: 1 | Status: SHIPPED
Start: 2021-09-21 | End: 2021-11-26

## 2021-09-27 ENCOUNTER — OFFICE VISIT (OUTPATIENT)
Dept: FAMILY MEDICINE CLINIC | Age: 36
End: 2021-09-27
Payer: COMMERCIAL

## 2021-09-27 VITALS
RESPIRATION RATE: 16 BRPM | WEIGHT: 185.2 LBS | OXYGEN SATURATION: 97 % | SYSTOLIC BLOOD PRESSURE: 118 MMHG | TEMPERATURE: 98.8 F | HEIGHT: 67 IN | HEART RATE: 91 BPM | DIASTOLIC BLOOD PRESSURE: 84 MMHG | BODY MASS INDEX: 29.07 KG/M2

## 2021-09-27 DIAGNOSIS — J30.89 NON-SEASONAL ALLERGIC RHINITIS, UNSPECIFIED TRIGGER: Primary | ICD-10-CM

## 2021-09-27 DIAGNOSIS — B96.89 ACUTE BACTERIAL SINUSITIS: ICD-10-CM

## 2021-09-27 DIAGNOSIS — J01.90 ACUTE BACTERIAL SINUSITIS: ICD-10-CM

## 2021-09-27 PROCEDURE — G8419 CALC BMI OUT NRM PARAM NOF/U: HCPCS | Performed by: FAMILY MEDICINE

## 2021-09-27 PROCEDURE — 90674 CCIIV4 VAC NO PRSV 0.5 ML IM: CPT | Performed by: FAMILY MEDICINE

## 2021-09-27 PROCEDURE — 99213 OFFICE O/P EST LOW 20 MIN: CPT | Performed by: FAMILY MEDICINE

## 2021-09-27 PROCEDURE — 90471 IMMUNIZATION ADMIN: CPT | Performed by: FAMILY MEDICINE

## 2021-09-27 PROCEDURE — G8427 DOCREV CUR MEDS BY ELIG CLIN: HCPCS | Performed by: FAMILY MEDICINE

## 2021-09-27 PROCEDURE — 1036F TOBACCO NON-USER: CPT | Performed by: FAMILY MEDICINE

## 2021-09-27 RX ORDER — CONTAINER,EMPTY
BOTTLE MISCELLANEOUS
COMMUNITY

## 2021-09-27 RX ORDER — DOXYCYCLINE HYCLATE 100 MG
100 TABLET ORAL 2 TIMES DAILY
Qty: 20 TABLET | Refills: 0 | Status: SHIPPED | OUTPATIENT
Start: 2021-09-27 | End: 2021-10-07

## 2021-09-27 RX ORDER — FLUTICASONE PROPIONATE 50 MCG
2 SPRAY, SUSPENSION (ML) NASAL DAILY
Qty: 16 G | Refills: 5 | Status: SHIPPED
Start: 2021-09-27 | End: 2022-03-29 | Stop reason: SDUPTHER

## 2021-09-27 RX ORDER — FEXOFENADINE HCL AND PSEUDOEPHEDRINE HCI 180; 240 MG/1; MG/1
1 TABLET, EXTENDED RELEASE ORAL DAILY
Qty: 30 TABLET | Refills: 2 | Status: SHIPPED | OUTPATIENT
Start: 2021-09-27

## 2021-09-27 RX ORDER — CLOBETASOL PROPIONATE 0.46 MG/ML
SOLUTION TOPICAL
COMMUNITY
Start: 2021-08-25

## 2021-09-27 SDOH — ECONOMIC STABILITY: FOOD INSECURITY: WITHIN THE PAST 12 MONTHS, YOU WORRIED THAT YOUR FOOD WOULD RUN OUT BEFORE YOU GOT MONEY TO BUY MORE.: NEVER TRUE

## 2021-09-27 SDOH — ECONOMIC STABILITY: FOOD INSECURITY: WITHIN THE PAST 12 MONTHS, THE FOOD YOU BOUGHT JUST DIDN'T LAST AND YOU DIDN'T HAVE MONEY TO GET MORE.: NEVER TRUE

## 2021-09-27 ASSESSMENT — SOCIAL DETERMINANTS OF HEALTH (SDOH): HOW HARD IS IT FOR YOU TO PAY FOR THE VERY BASICS LIKE FOOD, HOUSING, MEDICAL CARE, AND HEATING?: NOT HARD AT ALL

## 2021-09-27 NOTE — PROGRESS NOTES
9/27/2021    Chief Complaint   Patient presents with    Nasal Congestion     ongoing since June. Pt is using nasal spray.  Other     Pt complains of having a tingling sensation in head, ongoing for a month       HPI    Juan David Gatica is a 29 y.o. patient that presents today with cold symptoms. Allergic Rhinitis: Peg Mcmillan's current symptoms include nasal congestion, postnasal drip, sinus pressure and sneezing. Current triggers include exposure to no known precipitant. The patient has tried over the counter medications and prescription nasal sprays with adequate relief of symptoms. The patient has no history of asthma. .     Patient's past medical, surgical, social and/or family history reviewed, updated in chart, and are non-contributory (unless otherwise stated). Medications and allergies also reviewed and updated in chart. ROS negative unless otherwise specified    Physical Exam  /84   Pulse 91   Temp 98.8 °F (37.1 °C) (Temporal)   Resp 16   Ht 5' 7\" (1.702 m)   Wt 185 lb 3.2 oz (84 kg)   SpO2 97%   BMI 29.01 kg/m²     Wt Readings from Last 3 Encounters:   09/27/21 185 lb 3.2 oz (84 kg)   06/01/21 175 lb (79.4 kg)   05/19/21 186 lb (84.4 kg)     BP Readings from Last 3 Encounters:   09/27/21 118/84   05/19/21 118/80   01/18/21 138/85     General appearance: alert, well appearing, and in no distress, oriented to person, place, and time and normal appearing weight. HEENT:  HEAD: Atraumatic, normocephalic  EYES: PERRL  EOM intact  EARS: bilateral TM's and external ear canals normal  NOSE: nasal mucosa, septum, turbinates normal bilaterally  MOUTH: mucous membranes moist and normal tonsils  NECK: supple, full range of motion, no mass, normal lymphadenopathy, no thyromegaly    CVS exam: normal rate, regular rhythm, normal S1, S2, no murmurs, rubs, clicks or gallops. Radial pulses 2+ bilateral.  PT/DP pulse 2+ bilat.   No C/C/E    Chest: clear to auscultation, no wheezes, threatening disease. Patient and/or guardian verbalizes understanding and agrees with above counseling, assessment and plan. All questions answered.

## 2021-10-19 ENCOUNTER — E-VISIT (OUTPATIENT)
Dept: FAMILY MEDICINE CLINIC | Age: 36
End: 2021-10-19
Payer: COMMERCIAL

## 2021-10-19 PROCEDURE — 99422 OL DIG E/M SVC 11-20 MIN: CPT | Performed by: FAMILY MEDICINE

## 2021-10-19 RX ORDER — LEVOFLOXACIN 500 MG/1
500 TABLET, FILM COATED ORAL DAILY
Qty: 7 TABLET | Refills: 0 | Status: SHIPPED | OUTPATIENT
Start: 2021-10-19 | End: 2021-10-26

## 2021-10-19 ASSESSMENT — LIFESTYLE VARIABLES: SMOKING_STATUS: NO, I'VE NEVER SMOKED

## 2021-10-19 NOTE — PROGRESS NOTES
E-visit questionnaire reviewed. Chart appropriately reviewed given context. Allergies reviewed. A/P: sinusitis  11-20 minutes were spent on the digital evaluation and management of this patient.

## 2021-11-26 RX ORDER — MELOXICAM 15 MG/1
TABLET ORAL
Qty: 30 TABLET | Refills: 1 | Status: SHIPPED
Start: 2021-11-26 | End: 2022-01-24

## 2022-01-06 ENCOUNTER — E-VISIT (OUTPATIENT)
Dept: FAMILY MEDICINE CLINIC | Age: 37
End: 2022-01-06
Payer: COMMERCIAL

## 2022-01-06 DIAGNOSIS — J01.90 ACUTE BACTERIAL SINUSITIS: Primary | ICD-10-CM

## 2022-01-06 DIAGNOSIS — B96.89 ACUTE BACTERIAL SINUSITIS: Primary | ICD-10-CM

## 2022-01-06 PROCEDURE — 99422 OL DIG E/M SVC 11-20 MIN: CPT | Performed by: FAMILY MEDICINE

## 2022-01-06 RX ORDER — DOXYCYCLINE HYCLATE 100 MG/1
100 CAPSULE ORAL 2 TIMES DAILY
Qty: 28 CAPSULE | Refills: 0 | Status: SHIPPED | OUTPATIENT
Start: 2022-01-06 | End: 2022-01-19

## 2022-01-06 ASSESSMENT — LIFESTYLE VARIABLES: SMOKING_STATUS: NO, I'VE NEVER SMOKED

## 2022-01-06 NOTE — PROGRESS NOTES
The above-named patient has requested evaluation and management services through an electronic visit by way of [employer]ado powered by EP[employer] and provided by [address] [place] [address],[address]. The history of present illness provided by the patient as well as medications, allergies, past medical history have been reviewed in this electronic health record. Following evaluation and management recommendations have been made and communicated to the patient via Scalado:    Good evening, Neil. I am sorry you are experiencing all the sinus problems. Though you may have an acute bacterial sinus infection it sounds like you could also be suffering from chronic sinusitis which is a noninfectious form of inflammation. I have prescribed doxycycline to be taken twice a day for 14 days. Continue to use the Madina pot or Sinus Rinse. Phenylephrine or Sudafed taken separately from [place] would be fine. Please make sure that you are using your nasal steroid spray on a daily basis as well. When you see your primary care physician at your upcoming appointment, please discuss with her the possibility of needing additional evaluation of your sinuses. I hope you feel better soon. Diagnoses and all orders for this visit:    Acute bacterial sinusitis  Comments:  See above. Orders:  -     doxycycline hyclate (VIBRAMYCIN) 100 MG capsule; Take 1 capsule by mouth 2 times daily for 14 days        11-20 minutes were spent on the digital evaluation and management of this patient.

## 2022-01-19 ENCOUNTER — OFFICE VISIT (OUTPATIENT)
Dept: FAMILY MEDICINE CLINIC | Age: 37
End: 2022-01-19
Payer: COMMERCIAL

## 2022-01-19 VITALS
OXYGEN SATURATION: 97 % | DIASTOLIC BLOOD PRESSURE: 84 MMHG | HEART RATE: 79 BPM | SYSTOLIC BLOOD PRESSURE: 120 MMHG | HEIGHT: 67 IN | TEMPERATURE: 98.2 F | RESPIRATION RATE: 16 BRPM | WEIGHT: 191.3 LBS | BODY MASS INDEX: 30.02 KG/M2

## 2022-01-19 DIAGNOSIS — J30.9 ALLERGIC RHINITIS, UNSPECIFIED SEASONALITY, UNSPECIFIED TRIGGER: ICD-10-CM

## 2022-01-19 DIAGNOSIS — Z00.00 ANNUAL PHYSICAL EXAM: Primary | ICD-10-CM

## 2022-01-19 DIAGNOSIS — F41.9 ANXIETY: ICD-10-CM

## 2022-01-19 DIAGNOSIS — J30.89 NON-SEASONAL ALLERGIC RHINITIS, UNSPECIFIED TRIGGER: ICD-10-CM

## 2022-01-19 PROCEDURE — 99395 PREV VISIT EST AGE 18-39: CPT | Performed by: FAMILY MEDICINE

## 2022-01-19 RX ORDER — FLUOXETINE 10 MG/1
10 TABLET, FILM COATED ORAL DAILY
Qty: 30 TABLET | Refills: 3 | Status: SHIPPED
Start: 2022-01-19 | End: 2022-02-16 | Stop reason: DRUGHIGH

## 2022-01-19 RX ORDER — MONTELUKAST SODIUM 10 MG/1
10 TABLET ORAL DAILY
Qty: 90 TABLET | Refills: 3 | Status: SHIPPED | OUTPATIENT
Start: 2022-01-19

## 2022-01-19 ASSESSMENT — PATIENT HEALTH QUESTIONNAIRE - PHQ9
SUM OF ALL RESPONSES TO PHQ QUESTIONS 1-9: 1
SUM OF ALL RESPONSES TO PHQ9 QUESTIONS 1 & 2: 1
SUM OF ALL RESPONSES TO PHQ QUESTIONS 1-9: 1
2. FEELING DOWN, DEPRESSED OR HOPELESS: 0
1. LITTLE INTEREST OR PLEASURE IN DOING THINGS: 1

## 2022-01-19 NOTE — PROGRESS NOTES
1/19/2022    Chief Complaint   Patient presents with    Annual Exam    Anxiety     Started about 7 years ago and pt complains it is getting worse. Pt has speacial needs son and that contributes to the anxiety.  Referral - General     Pt would like a referral to ENT because he states he is \"sick and tired of not being able to breath\"       Screening Questions:    Exercise Yes.  Body mass index is 29.96 kg/m². Counseled on weight loss Yes.  Eye exam every 24 years, yearly if diabetic  Yes.  Dental Exam Yes.  Tobacco use. No.  Cessation discussed     AAA screening No.   Alcohol use/CAGE No.      Anxiety and/or Depression:  Patient is here with complaints of anxiety and/or depression. Patient does not have suicidal or homicidal ideation. Has little interest in doing activities. Patient is  having issues falling or staying asleep. Patient is  having associated panic attacks when he takes his son to appointments. Patient does not use alcohol and/or drugs. Labs:  No results found for: EAG  Lab Results   Component Value Date    [facility] 69 01/29/2021        Patient's past medical, surgical, social and/or family history reviewed, updated in chart, and are non-contributory (unless otherwise stated). Medications and allergies also reviewed and updated in chart.     ROS  Review of Systems - General ROS: negative for - chills, fatigue, fever, night sweats, sleep disturbance, weight gain or weight loss  Psychological ROS: negative for - anxiety, behavioral disorder, depression, hallucinations, irritability, memory difficulties, mood swings, sleep disturbances or suicidal ideation  ENT ROS: negative for - epistaxis, headaches, hearing change, nasal congestion, nasal discharge, nasal polyps, sinus pain, tinnitus, vertigo or visual changes  Hematological and Lymphatic ROS: negative for - bleeding problems, blood clots, fatigue or swollen lymph nodes  Respiratory ROS: negative for - cough, orthopnea, shortness of breath, sputum changes, tachypnea or wheezing  Cardiovascular ROS: negative for - chest pain, dyspnea on exertion, irregular heartbeat, loss of consciousness, palpitations, paroxysmal nocturnal dyspnea or rapid heart rate  Gastrointestinal ROS: negative for - abdominal pain, blood in stools, change in bowel habits, constipation, diarrhea, gas/bloating, heartburn or nausea/vomiting  Musculoskeletal ROS: negative for - joint pain, joint stiffness, joint swelling or muscle, back pain, bowel or bladder incontinence  Neurological ROS: negative for - behavioral changes, confusion, dizziness, headaches, memory loss, numbness/tingling, seizures or speech problems, weakness  Dermatological ROS: negative for - dry skin, mole changes, nail changes, pruritus, rash or skin lesion changes    Physical Exam  /84   Pulse 79   Temp 98.2 °F (36.8 °C) (Temporal)   Resp 16   Ht 5' 7\" (1.702 m)   Wt 191 lb 4.8 oz (86.8 kg)   SpO2 97%   BMI 29.96 kg/m²   Wt Readings from Last 3 Encounters:   01/19/22 191 lb 4.8 oz (86.8 kg)   09/27/21 185 lb 3.2 oz (84 kg)   06/01/21 175 lb (79.4 kg)     Temp Readings from Last 3 Encounters:   01/19/22 98.2 °F (36.8 °C) (Temporal)   09/27/21 98.8 °F (37.1 °C) (Temporal)   05/19/21 98.5 °F (36.9 °C)     BP Readings from Last 3 Encounters:   01/19/22 120/84   09/27/21 118/84   05/19/21 118/80     Pulse Readings from Last 3 Encounters:   01/19/22 79   09/27/21 91   05/19/21 82       General appearance: alert, well appearing, and in no distress, oriented to person, place, and time and normal appearing weight. CVS exam: normal rate, regular rhythm, normal S1, S2, no murmurs, rubs, clicks or gallops. Radial pulses 2+ bilateral.  PT/DP pulse 2+ bilat. No C/C/E    Chest: clear to auscultation, no wheezes, rales or rhonchi, symmetric air entry.      Abdomen: Soft, non-tender, non-distended, positive BS in all 4 quadrants    Extremities:Dorsalis pedis pulses palpated bilaterally, no clubbing, cyanosis, edema or erythema, Sensory exam of the foot is normal, tested with the monofilament. Good pulses, no lesions or ulcers, good peripheral pulses. SKIN: no lesions, jaundice, petechiae, pallor, cyanosis, ecchymosis    NEURO: gross motor exam normal by observation, gait normal    Mental status - alert, oriented to person, place, and time, normal mood, behavior, speech, dress, motor activity, and thought processes      Assessment/Plan  Milly Wolff was seen today for annual exam, anxiety and referral - general.    Diagnoses and all orders for this visit:    Annual physical exam    Allergic rhinitis, unspecified seasonality, unspecified trigger  -     montelukast (SINGULAIR) 10 MG tablet; Take 1 tablet by mouth daily  -     Ruby Trevino.DO, Otolaryngology, [place]    Non-seasonal allergic rhinitis, unspecified trigger  -     Steven Dawson., , Otolaryngology, [place]    Anxiety  -    START: FLUoxetine (PROZAC) 10 MG tablet; Take 1 tablet by mouth daily        Discussed preventive care and screenings, lab results and the importance of diet and exercise. Advised to return to the office for annual exam or sooner if needed. Return in about 4 weeks (around 2/16/2022), or if symptoms worsen or fail to improve. Call or go to ED immediately if symptoms worsen or persist.    Educational materials and/or home exercises printed for patient's review and were included in patient instructions on his/her After Visit Summary and given to patient at the end of visit. Counseled regarding above diagnosis, including possible risks and complications,  especially if left uncontrolled. Counseled regarding the possible side effects, risks, benefits and alternatives to treatment; patient and/or guardian verbalizes understanding, agrees, feels comfortable with and wishes to proceed with above treatment plan.     Advised patient to call with any new medication issues, and read all Rx info from pharmacy to assure aware of all possible risks and side effects of medication before taking. Reviewed age and gender appropriate health screening exams and vaccinations. Advised patient regarding importance of keeping up with recommended health maintenance and to schedule as soon as possible if overdue, as this is important in assessing for undiagnosed pathology, especially cancer, as well as protecting against potentially harmful/life threatening disease. Patient and/or guardian verbalizes understanding and agrees with above counseling, assessment and plan. All questions answered.

## 2022-01-21 ENCOUNTER — TELEPHONE (OUTPATIENT)
Dept: FAMILY MEDICINE CLINIC | Age: 37
End: 2022-01-21

## 2022-01-21 NOTE — TELEPHONE ENCOUNTER
Dr Hewitt's office called and they need patient to have a sinus cat scan without contrast to be ordered and done before his appointment on 2/15/22 @ 2:45

## 2022-01-24 DIAGNOSIS — J32.9 BACTERIAL SINUSITIS: ICD-10-CM

## 2022-01-24 DIAGNOSIS — B96.89 BACTERIAL SINUSITIS: ICD-10-CM

## 2022-01-24 DIAGNOSIS — J30.9 CHRONIC ALLERGIC RHINITIS: Primary | ICD-10-CM

## 2022-01-24 RX ORDER — MELOXICAM 15 MG/1
TABLET ORAL
Qty: 30 TABLET | Refills: 1 | Status: SHIPPED
Start: 2022-01-24 | End: 2022-05-03

## 2022-01-28 RX ORDER — MELOXICAM 15 MG/1
15 TABLET ORAL DAILY
Qty: 30 TABLET | Refills: 1 | Status: SHIPPED | OUTPATIENT
Start: 2022-01-28 | End: 2022-05-03

## 2022-02-15 ENCOUNTER — HOSPITAL ENCOUNTER (OUTPATIENT)
Dept: CT IMAGING | Age: 37
Discharge: HOME | End: 2022-02-17
Payer: COMMERCIAL

## 2022-02-15 ENCOUNTER — OFFICE VISIT (OUTPATIENT)
Dept: ENT CLINIC | Age: 37
End: 2022-02-15
Payer: COMMERCIAL

## 2022-02-15 VITALS
SYSTOLIC BLOOD PRESSURE: 121 MMHG | OXYGEN SATURATION: 98 % | BODY MASS INDEX: 29.03 KG/M2 | WEIGHT: 185 LBS | HEART RATE: 97 BPM | HEIGHT: 67 IN | TEMPERATURE: 98.4 F | DIASTOLIC BLOOD PRESSURE: 95 MMHG

## 2022-02-15 DIAGNOSIS — J33.8 MAXILLARY POLYP OF SINUS: ICD-10-CM

## 2022-02-15 DIAGNOSIS — R43.0 ANOSMIA: ICD-10-CM

## 2022-02-15 DIAGNOSIS — J30.89 SEASONAL ALLERGIC RHINITIS DUE TO OTHER ALLERGIC TRIGGER: Primary | ICD-10-CM

## 2022-02-15 DIAGNOSIS — R09.81 NASAL CONGESTION: ICD-10-CM

## 2022-02-15 DIAGNOSIS — J32.9 BACTERIAL SINUSITIS: ICD-10-CM

## 2022-02-15 DIAGNOSIS — B96.89 BACTERIAL SINUSITIS: ICD-10-CM

## 2022-02-15 DIAGNOSIS — J30.9 CHRONIC ALLERGIC RHINITIS: ICD-10-CM

## 2022-02-15 PROCEDURE — 99204 OFFICE O/P NEW MOD 45 MIN: CPT | Performed by: OTOLARYNGOLOGY

## 2022-02-15 PROCEDURE — 70486 CT MAXILLOFACIAL W/O DYE: CPT

## 2022-02-15 RX ORDER — AZELASTINE 1 MG/ML
2 SPRAY, METERED NASAL 2 TIMES DAILY
Qty: 120 ML | Refills: 1 | Status: SHIPPED | OUTPATIENT
Start: 2022-02-15

## 2022-02-15 ASSESSMENT — ENCOUNTER SYMPTOMS
EYE PAIN: 0
SINUS PAIN: 0
ABDOMINAL DISTENTION: 0
ABDOMINAL PAIN: 0
COLOR CHANGE: 0
PHOTOPHOBIA: 0
SHORTNESS OF BREATH: 0
CHEST TIGHTNESS: 0
SINUS PRESSURE: 0
TROUBLE SWALLOWING: 0

## 2022-02-15 NOTE — PROGRESS NOTES
Subjective:     Patient ID:  Sulma Tran is a 40 y.o. male. HPI:    Nasal issues  Patient presents with nasal issuesfor several months. his symptoms include nasal congestion, sneezing, frequent clearing of the throat, spitting/vomiting mucous. These symptoms are seasonal. Currenttriggers include exposure to no known precipitant. Prior antibiotic therapyhas included does not recall. Other medications have included Flonase, D-Allergy, oral decongestants for 3month(s) with fairrelief of symptoms. The patient has never had nasal polyps. The patient has no history of asthma. The patient has no history of eczema. .    The patient has not had sinussurgery in the past.     The patient does not suffer from frequent sinopulmonary infections. Allergy testing: no  Immunotherapy has never been tried  Headaches/Facial Pain: no    Nasal obstruction: yes   Side: bilateral    The patients worsttime of year is summer, fall, winter and spring    Past Medical History:   Diagnosis Date    Allergic rhinitis     Lactose intolerance      There are no problems to display for this patient. Past Surgical History:   Procedure Laterality Date    ANTERIOR CRUCIATE LIGAMENT REPAIR      COLONOSCOPY      ECHOCARDIOGRAM EXERCISE STRESS TEST  11/06/2013         TONSILLECTOMY       Family History   Problem Relation Age of Onset    Diabetes Mother     Other Father         pituitary tumor    Ovarian Cancer Maternal Grandmother     Cancer Maternal Grandfather     Lung Cancer Paternal Grandfather      Social History     Socioeconomic History    Marital status:      Spouse name: None    Number of children: None    Years of education: None    Highest education level: None   Occupational History    Occupation: Wastewater treatment   Tobacco Use    Smoking status: Never Smoker    Smokeless tobacco: Never Used   Substance and Sexual Activity    Alcohol use:  Yes     Alcohol/week: 4.0 standard drinks     Types: 4 Cans of beer per week     Comment: daily    Drug use: No    Sexual activity: Yes     Partners: Female   Other Topics Concern    None   Social History Narrative    None     Social Determinants of Health     Financial Resource Strain: Low Risk     Difficulty of Paying Living Expenses: Not hard at all   Food Insecurity: No Food Insecurity    Worried About Running Out of Food in the Last Year: Never true    [place] of Food in the Last Year: Never true   Transportation Needs:     Lack of Transportation (Medical): Not on file    Lack of Transportation (Non-Medical): Not on file   Physical Activity:     Days of Exercise per Week: Not on file    Minutes of Exercise per Session: Not on file   Stress:     Feeling of Stress : Not on file   Social Connections:     Frequency of Communication with Friends and Family: Not on file    Frequency of Social Gatherings with Friends and Family: Not on file    Attends Scientology Services: Not on file    Active Member of [address] [place] [address] or Organizations: Not on file    Attends Club or Organization Meetings: Not on file    Marital Status: Not on file   Intimate Partner Violence:     Fear of Current or Ex-Partner: Not on file    Emotionally Abused: Not on file    Physically Abused: Not on file    Sexually Abused: Not on file   Housing Stability:     Unable to Pay for Housing in the Last Year: Not on file    Number of [place] in the Last Year: Not on file    Unstable Housing in the Last Year: Not on file     Patient'smedications, allergies, past medical, surgical, social and family histories werereviewed and updated as appropriate. Review of Systems   Constitutional: Negative for chills and fever. HENT: Positive for congestion. Negative for ear discharge, ear pain, sinus pressure, sinus pain, tinnitus and trouble swallowing. Eyes: Negative for photophobia, pain and visual disturbance. Respiratory: Negative for chest tightness and shortness of breath. Cardiovascular: Negative for chest pain and palpitations. Gastrointestinal: Negative for abdominal distention and abdominal pain. Endocrine: Negative for cold intolerance and heat intolerance. Skin: Negative for color change and pallor. Neurological: Negative for dizziness and facial asymmetry. Psychiatric/Behavioral: Negative for behavioral problems and confusion. Objective:   Physical Exam  Constitutional:       Appearance: He is well-developed. HENT:      Head: Normocephalic and atraumatic. Right Ear: Tympanic membrane, ear canal and external ear normal.      Left Ear: Tympanic membrane, ear canal and external ear normal.      Nose: Nose normal.      Mouth/Throat:      Pharynx: Uvula midline. Eyes:      Pupils: Pupils are equal, round, and reactive to light. Cardiovascular:      Rate and Rhythm: Normal rate. Heart sounds: Normal heart sounds. Pulmonary:      Effort: Pulmonary effort is normal.      Breath sounds: Normal breath sounds. Abdominal:      General: Bowel sounds are normal. There is no distension. Palpations: Abdomen is soft. Musculoskeletal:      Cervical back: Normal range of motion and neck supple. Skin:     General: Skin is warm and dry. Neurological:      Mental Status: He is alert and oriented to person, place, and time. Psychiatric:         Behavior: Behavior normal.                 Assessment:       Diagnosis Orders   1. Seasonal allergic rhinitis due to other allergic trigger     2. Maxillary polyp of sinus     3. Nasal congestion              Plan:      Cessation of Sudafed or Allegra-D use  Continue Flonase  Start Astelin  If not better, recommend:  Functional endoscopic sinus surgery with bilateral maxillary with polyps removal, frontal and sphenoid antrostomies, submucous resection of inferior turbinates. The procedure risks and benefits were discussed with the patient and family.  Pt and family understood and decided to proceed with the surgery. Surgical risks include:    --Injury to the lining of the brain, meningitis, stroke and death - most common in the approach to the frontal sinus but may also occur in operations on the sphenoid and ethmoid sinuses  --Injury to the carotid artery - most common in sphenoid sinus surgery  --Injury to the orbit, eye and eye muscles. Most commonly the medial rectus muscle is injured, this can cause double vision. The optic nerve can be injured during sphenoid sinus surgery. Injury to the Nasolacrimal Duct can cause tearing. -- Bleeding or air in the orbit. If this happens, blindness can occur due to pressure. Relief of the pressure is mandatory to prevent loss of sight. Follow up in 1 month(s)                       Jazzmine Mauro  1985    I have discussed the case, including pertinent history and exam findings with the resident. I have seen and examined the patient and the key elements of the encounter have been performed by me. I agree with the assessment, plan and orders as documented by the  resident              Remainder of medical problems as per  resident note. Patient seen and examined. Agree with above exam, assessment and plan.       Electronically signed by Mary Ellen Crowder DO on 2/21/22 at 11:23 AM EST

## 2022-02-16 ENCOUNTER — TELEPHONE (OUTPATIENT)
Dept: ENT CLINIC | Age: 37
End: 2022-02-16

## 2022-02-16 ENCOUNTER — OFFICE VISIT (OUTPATIENT)
Dept: FAMILY MEDICINE CLINIC | Age: 37
End: 2022-02-16
Payer: COMMERCIAL

## 2022-02-16 VITALS
WEIGHT: 189 LBS | HEIGHT: 67 IN | SYSTOLIC BLOOD PRESSURE: 122 MMHG | OXYGEN SATURATION: 98 % | BODY MASS INDEX: 29.66 KG/M2 | RESPIRATION RATE: 18 BRPM | HEART RATE: 89 BPM | DIASTOLIC BLOOD PRESSURE: 86 MMHG | TEMPERATURE: 98 F

## 2022-02-16 DIAGNOSIS — F41.9 ANXIETY: Primary | ICD-10-CM

## 2022-02-16 PROCEDURE — 99214 OFFICE O/P EST MOD 30 MIN: CPT | Performed by: FAMILY MEDICINE

## 2022-02-16 RX ORDER — FLUOXETINE HYDROCHLORIDE 20 MG/1
20 CAPSULE ORAL DAILY
Qty: 90 CAPSULE | Refills: 1 | Status: SHIPPED
Start: 2022-02-16 | End: 2022-08-01

## 2022-02-16 NOTE — PROGRESS NOTES
2/16/2022    Chief Complaint   Patient presents with    Anxiety     1 week medication follow up  thinks a little more would hel       HPI    Emilie Olszewski is a 40 y.o. patient that presents today for:       Anxiety and/or Depression:  Patient is here with complaints of anxiety and/or depression. Patient does not have suicidal or homicidal ideation. Has little interest in doing activities. Patient is  having issues falling or staying asleep. Patient is  having associated panic attacks when he takes his son to appointments. Patient does not use alcohol and/or drugs    States he is tolerating the medication well, but feels that an increase in the fluoxetine would be of benefit. Patient's past medical, surgical, social and/or family history reviewed, updated in chart, and are non-contributory (unless otherwise stated). Medications and allergies also reviewed and updated in chart. ROS negative unless otherwise specified    Physical Exam  Temp Readings from Last 3 Encounters:   02/16/22 98 °F (36.7 °C)   02/15/22 98.4 °F (36.9 °C)   01/19/22 98.2 °F (36.8 °C) (Temporal)     Wt Readings from Last 3 Encounters:   02/16/22 189 lb (85.7 kg)   02/15/22 185 lb (83.9 kg)   01/19/22 191 lb 4.8 oz (86.8 kg)     BP Readings from Last 3 Encounters:   02/16/22 122/86   02/15/22 (!) 121/95   01/19/22 120/84     Pulse Readings from Last 3 Encounters:   02/16/22 89   02/15/22 97   01/19/22 79       General appearance: alert, well appearing, and in no distress, oriented to person, place, and time and normal appearing weight. CVS exam: normal rate, regular rhythm, normal S1, S2, no murmurs, rubs, clicks or gallops. Radial pulses 2+ bilateral.  PT/DP pulse 2+ bilat. No C/C/E    Chest: clear to auscultation, no wheezes, rales or rhonchi, symmetric air entry.      Abdomen: Soft, non-tender, non-distended, positive BS in all 4 quadrants    Extremities:Dorsalis pedis pulses palpated bilaterally, no clubbing, cyanosis, edema or erythema,     SKIN: no lesions, jaundice, petechiae, pallor, cyanosis, ecchymosis    NEURO: gross motor exam normal by observation, gait normal    Mental status - alert, oriented to person, place, and time, normal mood, behavior, speech, dress, motor activity, and thought processes      Assessment/Plan  Megan Rivera was seen today for anxiety. Diagnoses and all orders for this visit:    Anxiety  -     INCREASE: FLUoxetine (PROZAC) 20 MG capsule; Take 1 capsule by mouth daily STOP prior prescription. New prescription reflects dose increase          Return in about 6 months (around 8/16/2022), or if symptoms worsen or fail to improve. Linette Krishnamurthy, DO    Call or go to ED immediately if symptoms worsen or persist.    Educational materials and/or home exercises printed for patient's review and were included in patient instructions on his/her After Visit Summary and given to patient at the end of visit. Counseled regarding above diagnosis, including possible risks and complications,  especially if left uncontrolled. Counseled regarding the possible side effects, risks, benefits and alternatives to treatment; patient and/or guardian verbalizes understanding, agrees, feels comfortable with and wishes to proceed with above treatment plan. Advised patient to call with any new medication issues, and read all Rx info from pharmacy to assure aware of all possible risks and side effects of medication before taking. Reviewed age and gender appropriate health screening exams and vaccinations. Advised patient regarding importance of keeping up with recommended health maintenance and to schedule as soon as possible if overdue, as this is important in assessing for undiagnosed pathology, especially cancer, as well as protecting against potentially harmful/life threatening disease. Patient and/or guardian verbalizes understanding and agrees with above counseling, assessment and plan.     All

## 2022-02-16 NOTE — TELEPHONE ENCOUNTER
[facility] to authorize order with patient insurance. Patient is scheduled for brain MRI with radiology on 3/4/22 @ 3:30pm. Patient has been notified of date and time and that they need to arrive at 2:45pm. Patient was informed he has no prep prior to procedure. Patient instructed to park in [facility] parking lot and report to registration. Patient verbalized understanding.     Electronically signed by Oli Alejandre MA on 2/16/22 at 9:55 AM EST no

## 2022-03-04 ENCOUNTER — HOSPITAL ENCOUNTER (OUTPATIENT)
Dept: MRI IMAGING | Age: 37
Discharge: HOME | End: 2022-03-06
Payer: COMMERCIAL

## 2022-03-04 DIAGNOSIS — J30.89 SEASONAL ALLERGIC RHINITIS DUE TO OTHER ALLERGIC TRIGGER: ICD-10-CM

## 2022-03-04 DIAGNOSIS — J33.8 MAXILLARY POLYP OF SINUS: ICD-10-CM

## 2022-03-04 DIAGNOSIS — R43.0 ANOSMIA: ICD-10-CM

## 2022-03-04 PROCEDURE — A9579 GAD-BASE MR CONTRAST NOS,1ML: HCPCS | Performed by: RADIOLOGY

## 2022-03-04 PROCEDURE — 6360000004 HC RX CONTRAST MEDICATION: Performed by: RADIOLOGY

## 2022-03-04 PROCEDURE — 70553 MRI BRAIN STEM W/O & W/DYE: CPT

## 2022-03-04 RX ADMIN — GADOTERIDOL 17 ML: 279.3 INJECTION, SOLUTION INTRAVENOUS at 15:51

## 2022-03-15 ENCOUNTER — OFFICE VISIT (OUTPATIENT)
Dept: ENT CLINIC | Age: 37
End: 2022-03-15
Payer: COMMERCIAL

## 2022-03-15 VITALS
DIASTOLIC BLOOD PRESSURE: 93 MMHG | WEIGHT: 181 LBS | OXYGEN SATURATION: 97 % | HEIGHT: 67 IN | BODY MASS INDEX: 28.41 KG/M2 | SYSTOLIC BLOOD PRESSURE: 131 MMHG | HEART RATE: 96 BPM | TEMPERATURE: 98.1 F

## 2022-03-15 DIAGNOSIS — R43.0 ANOSMIA: ICD-10-CM

## 2022-03-15 DIAGNOSIS — J33.8 MAXILLARY POLYP OF SINUS: ICD-10-CM

## 2022-03-15 DIAGNOSIS — R09.81 NASAL CONGESTION: ICD-10-CM

## 2022-03-15 DIAGNOSIS — J30.89 SEASONAL ALLERGIC RHINITIS DUE TO OTHER ALLERGIC TRIGGER: Primary | ICD-10-CM

## 2022-03-15 PROCEDURE — 99213 OFFICE O/P EST LOW 20 MIN: CPT | Performed by: OTOLARYNGOLOGY

## 2022-03-15 RX ORDER — ACETAMINOPHEN 160 MG
TABLET,DISINTEGRATING ORAL
COMMUNITY
End: 2022-05-03

## 2022-03-15 ASSESSMENT — ENCOUNTER SYMPTOMS
SINUS PAIN: 0
CHEST TIGHTNESS: 0
TROUBLE SWALLOWING: 0
PHOTOPHOBIA: 0
EYE PAIN: 0
SHORTNESS OF BREATH: 0
ABDOMINAL PAIN: 0
COLOR CHANGE: 0
ABDOMINAL DISTENTION: 0
SINUS PRESSURE: 0

## 2022-03-15 NOTE — PATIENT INSTRUCTIONS
Thank you for choosing our [facility] or [facility] practice. We are committed to your medical treatment and  care. If you need to reschedule or cancel your surgery or follow up  appointment, please call the surgery scheduler at [phone]. INSTRUCTIONS FOR SURGERY    Nothing to eat or drink after midnight the night before surgery unless surgery is at [facility] or otherwise instructed by the hospital.    DO NOT TAKE ANY ASPIRIN PRODUCTS 7 days prior to surgery-unless required by your cardiologist or primary care physician. Tylenol only. No Advil, Motrin, Aleve, or Ibuprofen    Any illegal drugs in your system (including Marijuana even if legally prescribed) will result in your surgery being cancelled. Please be sure to check with our office or the hospital on time frame for the drugs to be out of your system. Should your insurance change at any time you must contact our office. Failure to do so may result in your surgery being rescheduled. If you need paperwork filled out for work, you must give the office 2 weeks to complete and submit the forms.       [address], [address] [facility] will call you a couple days prior to your surgery and give you further instructions, if any questions call them at [phone]

## 2022-03-15 NOTE — PROGRESS NOTES
Subjective:     Patient ID:  Lauren Enciso is a 40 y.o. male. HPI:    Nasal issues  Patient presents with nasal issuesfor several months. his symptoms include nasal congestion, sneezing, frequent clearing of the throat, spitting/vomiting mucous. These symptoms are seasonal. Currenttriggers include exposure to no known precipitant. Prior antibiotic therapyhas included does not recall. Other medications have included Flonase, D-Allergy, oral decongestants for 3month(s) with fairrelief of symptoms. Here today for follow up of brain MRI and to discuss sx. The patient has never had nasal polyps. The patient has no history of asthma. The patient has no history of eczema. .    The patient has not had sinussurgery in the past.     The patient does not suffer from frequent sinopulmonary infections. Allergy testing: no  Immunotherapy has never been tried  Headaches/Facial Pain: no    Nasal obstruction: yes   Side: bilateral    The patients worsttime of year is summer, fall, winter and spring    Past Medical History:   Diagnosis Date    Allergic rhinitis     Lactose intolerance      There are no problems to display for this patient.     Past Surgical History:   Procedure Laterality Date    ANTERIOR CRUCIATE LIGAMENT REPAIR      COLONOSCOPY      ECHOCARDIOGRAM EXERCISE STRESS TEST  11/06/2013         TONSILLECTOMY       Family History   Problem Relation Age of Onset    Diabetes Mother     Other Father         pituitary tumor    Ovarian Cancer Maternal Grandmother     Cancer Maternal Grandfather     Lung Cancer Paternal Grandfather      Social History     Socioeconomic History    Marital status:      Spouse name: None    Number of children: None    Years of education: None    Highest education level: None   Occupational History    Occupation: Wastewater treatment   Tobacco Use    Smoking status: Never Smoker    Smokeless tobacco: Never Used   Substance and Sexual Activity    Alcohol use: Yes     Alcohol/week: 4.0 standard drinks     Types: 4 Cans of beer per week     Comment: daily    Drug use: No    Sexual activity: Yes     Partners: Female   Other Topics Concern    None   Social History Narrative    None     Social Determinants of Health     Financial Resource Strain: Low Risk     Difficulty of Paying Living Expenses: Not hard at all   Food Insecurity: No Food Insecurity    Worried About Running Out of Food in the Last Year: Never true    [place] of Food in the Last Year: Never true   Transportation Needs:     Lack of Transportation (Medical): Not on file    Lack of Transportation (Non-Medical): Not on file   Physical Activity:     Days of Exercise per Week: Not on file    Minutes of Exercise per Session: Not on file   Stress:     Feeling of Stress : Not on file   Social Connections:     Frequency of Communication with Friends and Family: Not on file    Frequency of Social Gatherings with Friends and Family: Not on file    Attends Bahai Services: Not on file    Active Member of [address] [place] [address] or Organizations: Not on file    Attends Club or Organization Meetings: Not on file    Marital Status: Not on file   Intimate Partner Violence:     Fear of Current or Ex-Partner: Not on file    Emotionally Abused: Not on file    Physically Abused: Not on file    Sexually Abused: Not on file   Housing Stability:     Unable to Pay for Housing in the Last Year: Not on file    Number of [place] in the Last Year: Not on file    Unstable Housing in the Last Year: Not on file     Patient'smedications, allergies, past medical, surgical, social and family histories werereviewed and updated as appropriate. Review of Systems   Constitutional: Negative for chills and fever. HENT: Positive for congestion. Negative for ear discharge, ear pain, sinus pressure, sinus pain, tinnitus and trouble swallowing. Eyes: Negative for photophobia, pain and visual disturbance.    Respiratory: Negative for chest tightness and shortness of breath. Cardiovascular: Negative for chest pain and palpitations. Gastrointestinal: Negative for abdominal distention and abdominal pain. Endocrine: Negative for cold intolerance and heat intolerance. Skin: Negative for color change and pallor. Neurological: Negative for dizziness and facial asymmetry. Psychiatric/Behavioral: Negative for behavioral problems and confusion. Objective:   Physical Exam  Constitutional:       Appearance: He is well-developed. HENT:      Head: Normocephalic and atraumatic. Right Ear: Tympanic membrane, ear canal and external ear normal.      Left Ear: Tympanic membrane, ear canal and external ear normal.      Nose: Nose normal.      Mouth/Throat:      Pharynx: Uvula midline. Eyes:      Pupils: Pupils are equal, round, and reactive to light. Cardiovascular:      Rate and Rhythm: Normal rate. Heart sounds: Normal heart sounds. Pulmonary:      Effort: Pulmonary effort is normal.      Breath sounds: Normal breath sounds. Abdominal:      General: Bowel sounds are normal. There is no distension. Palpations: Abdomen is soft. Musculoskeletal:      Cervical back: Normal range of motion and neck supple. Skin:     General: Skin is warm and dry. Neurological:      Mental Status: He is alert and oriented to person, place, and time. Psychiatric:         Behavior: Behavior normal.                     Assessment:       Diagnosis Orders   1. Seasonal allergic rhinitis due to other allergic trigger     2. Maxillary polyp of sinus     3. Nasal congestion     4. Anosmia              Plan:      Brain MRI negative   Flonase and Astelin with no improvement  Recommend       Functional endoscopic sinus surgery with bilateral maxillary with polyps removal, frontal and sphenoid antrostomies, submucous resection of inferior turbinates.       The procedure risks and benefits were discussed with the patient and family. Pt and family understood and decided to proceed with the surgery. Surgical risks include:    --Injury to the lining of the brain, meningitis, stroke and death - most common in the approach to the frontal sinus but may also occur in operations on the sphenoid and ethmoid sinuses  --Injury to the carotid artery - most common in sphenoid sinus surgery  --Injury to the orbit, eye and eye muscles. Most commonly the medial rectus muscle is injured, this can cause double vision. The optic nerve can be injured during sphenoid sinus surgery. Injury to the Nasolacrimal Duct can cause tearing. -- Bleeding or air in the orbit. If this happens, blindness can occur due to pressure. Relief of the pressure is mandatory to prevent loss of sight. Follow up in 1 month(s)               [facility]  1985    I have discussed the case, including pertinent history and exam findings with the resident. I have seen and examined the patient and the key elements of the encounter have been performed by me. I agree with the assessment, plan and orders as documented by the  resident              Remainder of medical problems as per  resident note. Patient seen and examined. Agree with above exam, assessment and plan.       Electronically signed by Kathy Zelaya DO on 3/25/22 at 7:54 AM EDT

## 2022-03-21 RX ORDER — MELOXICAM 15 MG/1
TABLET ORAL
Qty: 30 TABLET | Refills: 1 | OUTPATIENT
Start: 2022-03-21

## 2022-03-29 DIAGNOSIS — J30.89 NON-SEASONAL ALLERGIC RHINITIS, UNSPECIFIED TRIGGER: ICD-10-CM

## 2022-03-30 RX ORDER — FLUTICASONE PROPIONATE 50 MCG
2 SPRAY, SUSPENSION (ML) NASAL DAILY
Qty: 16 G | Refills: 5 | Status: SHIPPED
Start: 2022-03-30 | End: 2022-09-21

## 2022-04-25 ENCOUNTER — TELEPHONE (OUTPATIENT)
Dept: ENT CLINIC | Age: 37
End: 2022-04-25

## 2022-04-25 NOTE — TELEPHONE ENCOUNTER
Prior Authorization Form:      DEMOGRAPHICS:                     Patient Name:  Judeth Seip  Patient :  1985            Insurance:  Payor: [facility] / Plan: [facility] PPO / Product Type: *No Product type* /   Insurance ID Number:    Payor/Plan Subscr  Sex Relation Sub. Ins. ID Effective Group Num   1.  [address] 1985 Male Self [id number]                                    [id number]         DIAGNOSIS & PROCEDURE:                       Procedure/Operation: FESS SMRITS            CPT Code: [id number]    Diagnosis:  Chronic sinusitis     ICD10 Code: J32.0    Location:  [facility]    Surgeon:  Sarahi Sears INFORMATION:                          Date: 2022    Time: N/A              Anesthesia:  General                                                       Status:  Outpatient        Special Comments:  Include all notes        Electronically signed by Karoline Diamond MA on 2022 at 3:45 PM

## 2022-05-03 NOTE — PROGRESS NOTES
Have you been tested for COVID  Yes           Have you been told you were positive for COVID No  Have you had any known exposure to someone that is positive for COVID No  Do you have a cough                   No              Do you have shortness of breath No                 Do you have a sore throat            No                Are you having chills                    No                Are you having muscle aches. No                    Please come to the hospital wearing a mask and have your significant other wear a mask as well. Both of you should check your temperature before leaving to come here,  if it is 100 or higher please call [phone] for instruction. Ricky PRE-ADMISSION TESTING INSTRUCTIONS    The Preadmission Testing patient is instructed accordingly using the following criteria (check applicable):    ARRIVAL INSTRUCTIONS:  [x] Parking the day of Surgery is located in the Main Entrance lot. Upon entering the door, make an immediate right to the surgery reception desk    [x] Bring photo ID and insurance card    [] Bring in a copy of Living will or Durable Power of  papers.     [x] Please be sure to arrange for responsible adult to provide transportation to and from the hospital    [x] Please arrange for responsible adult to be with you for the 24 hour period post procedure due to having anesthesia      GENERAL INSTRUCTIONS:    [x] Nothing by mouth after midnight, including gum, candy, mints or water    [x] You may brush your teeth, but do not swallow any water    [x] Take medications as instructed with 1-2 oz of water    [] Stop herbal supplements and vitamins 5 days prior to procedure    [] Follow preop dosing of blood thinners per physician instructions    [] Take 1/2 dose of evening insulin, but no insulin after midnight    [] No oral diabetic medications after midnight    [] If diabetic and have low blood sugar or feel symptomatic, take 1-2oz apple juice only    [] Bring inhalers day of surgery    [] Bring C-PAP/ Bi-Pap day of surgery    [] Bring urine specimen day of surgery    [x] Shower or bath with soap, lather and rinse well, AM of Surgery, no lotion, powders or creams to surgical site    [] Follow bowel prep as instructed per surgeon    [x] No tobacco products within 24 hours of surgery     [x] No alcohol or illegal drug use within 24 hours of surgery.     [x] Jewelry, body piercing's, eyeglasses, contact lenses and dentures are not permitted into surgery (bring cases)      [x] Please do not wear any nail polish, make up or hair products on the day of surgery    [x] You can expect a call the business day prior to procedure to notify you if your arrival time changes    [x] If you receive a survey after surgery we would greatly appreciate your comments    [] Parent/guardian of a minor must accompany their child and remain on the premises  the entire time they are under our care     [] Pediatric patients may bring favorite toy, blanket or comfort item with them    [] A caregiver or family member must remain with the patient during their stay if they are mentally handicapped, have dementia, disoriented or unable to use a call light or would be a safety concern if left unattended    [x] Please notify surgeon if you develop any illness between now and time of surgery (cold, cough, sore throat, fever, nausea, vomiting) or any signs of infections  including skin, wounds, and dental.    [x]  The Outpatient Pharmacy is available to fill your prescription here on your day of surgery, ask your preop nurse for details    [] Other instructions    EDUCATIONAL MATERIALS PROVIDED:    [] PAT Preoperative Education Packet/Booklet     [] Medication List    [] Transfusion bracelet applied with instructions    [] Shower with soap, lather and rinse well, and use CHG wipes provided the evening before surgery as instructed    [] Incentive spirometer with instructions

## 2022-05-08 NOTE — H&P
Subjective:      Patient ID:  Sue Prasad is a 40 y.o. male.     HPI:     Nasal issues  Patient presents with nasal issuesfor several months. his symptoms include nasal congestion, sneezing, frequent clearing of the throat, spitting/vomiting mucous. These symptoms are seasonal. Currenttriggers include exposure to no known precipitant. Prior antibiotic therapyhas included does not recall. Other medications have included Flonase, D-Allergy, oral decongestants for 3month(s) with fairrelief of symptoms.       Here today for follow up of brain MRI and to discuss sx.     The patient has never had nasal polyps. The patient has no history of asthma. The patient has no history of eczema. .     The patient has not had sinussurgery in the past.      The patient does not suffer from frequent sinopulmonary infections.      Allergy testing: no  Immunotherapy has never been tried  Headaches/Facial Pain: no     Nasal obstruction: yes              Side: bilateral     The patients worsttime of year is summer, fall, winter and spring     Past Medical History        Past Medical History:   Diagnosis Date    Allergic rhinitis      Lactose intolerance           There are no problems to display for this patient.     Past Surgical History         Past Surgical History:   Procedure Laterality Date    ANTERIOR CRUCIATE LIGAMENT REPAIR        COLONOSCOPY        ECHOCARDIOGRAM EXERCISE STRESS TEST   11/06/2013          TONSILLECTOMY             Family History         Family History   Problem Relation Age of Onset    Diabetes Mother      Other Father           pituitary tumor    Ovarian Cancer Maternal Grandmother      Cancer Maternal Grandfather      Lung Cancer Paternal Grandfather           Social History   Social History            Socioeconomic History    Marital status:        Spouse name: None    Number of children: None    Years of education: None    Highest education level: None   Occupational History    Occupation: Wastewater treatment   Tobacco Use    Smoking status: Never Smoker    Smokeless tobacco: Never Used   Substance and Sexual Activity    Alcohol use: Yes       Alcohol/week: 4.0 standard drinks       Types: 4 Cans of beer per week       Comment: daily    Drug use: No    Sexual activity: Yes       Partners: Female   Other Topics Concern    None   Social History Narrative    None      Social Determinants of Health          Financial Resource Strain: Low Risk     Difficulty of Paying Living Expenses: Not hard at all   Food Insecurity: No Food Insecurity    Worried About Running Out of Food in the Last Year: Never true    [place] of Food in the Last Year: Never true   Transportation Needs:     Lack of Transportation (Medical): Not on file    Lack of Transportation (Non-Medical): Not on file   Physical Activity:     Days of Exercise per Week: Not on file    Minutes of Exercise per Session: Not on file   Stress:     Feeling of Stress : Not on file   Social Connections:     Frequency of Communication with Friends and Family: Not on file    Frequency of Social Gatherings with Friends and Family: Not on file    Attends Cheondoism Services: Not on file    Active Member of [address] [place] [address] or Organizations: Not on file    Attends Club or Organization Meetings: Not on file    Marital Status: Not on file   Intimate Partner Violence:     Fear of Current or Ex-Partner: Not on file    Emotionally Abused: Not on file    Physically Abused: Not on file    Sexually Abused: Not on file   Housing Stability:     Unable to Pay for Housing in the Last Year: Not on file    Number of [place] in the Last Year: Not on file    Unstable Housing in the Last Year: Not on file         Patient'smedications, allergies, past medical, surgical, social and family histories werereviewed and updated as appropriate.           Review of Systems   Constitutional: Negative for chills and fever. HENT: Positive for congestion. Negative for ear discharge, ear pain, sinus pressure, sinus pain, tinnitus and trouble swallowing. Eyes: Negative for photophobia, pain and visual disturbance. Respiratory: Negative for chest tightness and shortness of breath. Cardiovascular: Negative for chest pain and palpitations. Gastrointestinal: Negative for abdominal distention and abdominal pain. Endocrine: Negative for cold intolerance and heat intolerance. Skin: Negative for color change and pallor. Neurological: Negative for dizziness and facial asymmetry. Psychiatric/Behavioral: Negative for behavioral problems and confusion.                     Objective:   Physical Exam  Constitutional:       Appearance: He is well-developed. HENT:      Head: Normocephalic and atraumatic. Right Ear: Tympanic membrane, ear canal and external ear normal.      Left Ear: Tympanic membrane, ear canal and external ear normal.      Nose: Nose normal.      Mouth/Throat:      Pharynx: Uvula midline. Eyes:      Pupils: Pupils are equal, round, and reactive to light. Cardiovascular:      Rate and Rhythm: Normal rate. Heart sounds: Normal heart sounds. Pulmonary:      Effort: Pulmonary effort is normal.      Breath sounds: Normal breath sounds. Abdominal:      General: Bowel sounds are normal. There is no distension. Palpations: Abdomen is soft. Musculoskeletal:      Cervical back: Normal range of motion and neck supple. Skin:     General: Skin is warm and dry. Neurological:      Mental Status: He is alert and oriented to person, place, and time. Psychiatric:         Behavior: Behavior normal.                            Assessment:        Diagnosis Orders   1. Seasonal allergic rhinitis due to other allergic trigger      2. Maxillary polyp of sinus      3. Nasal congestion      4.  Anosmia                             Plan:      Brain MRI negative   Flonase and Astelin with no improvement  Recommend         Functional endoscopic sinus surgery with bilateral maxillary with polyps removal, frontal and sphenoid antrostomies, submucous resection of inferior turbinates.       The procedure risks and benefits were discussed with the patient and family. Pt and family understood and decided to proceed with the surgery.     Surgical risks include:    --Injury to the lining of the brain, meningitis, stroke and death - most common in the approach to the frontal sinus but may also occur in operations on the sphenoid and ethmoid sinuses  --Injury to the carotid artery - most common in sphenoid sinus surgery  --Injury to the orbit, eye and eye muscles.   Most commonly the medial rectus muscle is injured, this can cause double vision.  The optic nerve can be injured during sphenoid sinus surgery.   Injury to the Nasolacrimal Duct can cause tearing. -- Bleeding or air in the orbit. If this happens, blindness can occur due to pressure. Relief of the pressure is mandatory to prevent loss of sight.     Follow up in 1 month(s)                              Esdras Gibson  1985     I have discussed the case, including pertinent history and exam findings with the resident. I have seen and examined the patient and the key elements of the encounter have been performed by me. I agree with the assessment, plan and orders as documented by the  resident                 Remainder of medical problems as per  resident note.    Patient seen and examined.  Agree with above exam, assessment and plan.        Electronically signed by Mason Rodriguez DO on 3/25/22 at 7:54 AM EDT

## 2022-05-09 ENCOUNTER — ANESTHESIA (OUTPATIENT)
Dept: OPERATING ROOM | Age: 37
End: 2022-05-09
Payer: COMMERCIAL

## 2022-05-09 ENCOUNTER — HOSPITAL ENCOUNTER (OUTPATIENT)
Age: 37
Setting detail: OUTPATIENT SURGERY
Discharge: HOME | End: 2022-05-09
Attending: OTOLARYNGOLOGY | Admitting: OTOLARYNGOLOGY
Payer: COMMERCIAL

## 2022-05-09 ENCOUNTER — ANESTHESIA EVENT (OUTPATIENT)
Dept: OPERATING ROOM | Age: 37
End: 2022-05-09
Payer: COMMERCIAL

## 2022-05-09 VITALS
HEART RATE: 75 BPM | BODY MASS INDEX: 29.73 KG/M2 | SYSTOLIC BLOOD PRESSURE: 124 MMHG | DIASTOLIC BLOOD PRESSURE: 65 MMHG | RESPIRATION RATE: 16 BRPM | WEIGHT: 185 LBS | HEIGHT: 66 IN | TEMPERATURE: 97.6 F | OXYGEN SATURATION: 92 %

## 2022-05-09 VITALS — DIASTOLIC BLOOD PRESSURE: 79 MMHG | SYSTOLIC BLOOD PRESSURE: 127 MMHG | OXYGEN SATURATION: 85 %

## 2022-05-09 DIAGNOSIS — G89.18 POST-OP PAIN: Primary | ICD-10-CM

## 2022-05-09 PROCEDURE — 3700000001 HC ADD 15 MINUTES (ANESTHESIA): Performed by: OTOLARYNGOLOGY

## 2022-05-09 PROCEDURE — 6360000002 HC RX W HCPCS: Performed by: NURSE ANESTHETIST, CERTIFIED REGISTERED

## 2022-05-09 PROCEDURE — 6370000000 HC RX 637 (ALT 250 FOR IP): Performed by: OTOLARYNGOLOGY

## 2022-05-09 PROCEDURE — 31200 REMOVAL OF ETHMOID SINUS: CPT | Performed by: OTOLARYNGOLOGY

## 2022-05-09 PROCEDURE — 2500000003 HC RX 250 WO HCPCS: Performed by: NURSE ANESTHETIST, CERTIFIED REGISTERED

## 2022-05-09 PROCEDURE — 3600000003 HC SURGERY LEVEL 3 BASE: Performed by: OTOLARYNGOLOGY

## 2022-05-09 PROCEDURE — 2500000003 HC RX 250 WO HCPCS: Performed by: OTOLARYNGOLOGY

## 2022-05-09 PROCEDURE — 30140 RESECT INFERIOR TURBINATE: CPT | Performed by: OTOLARYNGOLOGY

## 2022-05-09 PROCEDURE — 31298 NSL/SINS NDSC SURG FRNT&SPHN: CPT | Performed by: OTOLARYNGOLOGY

## 2022-05-09 PROCEDURE — 7100000000 HC PACU RECOVERY - FIRST 15 MIN: Performed by: OTOLARYNGOLOGY

## 2022-05-09 PROCEDURE — C2625 STENT, NON-COR, TEM W/DEL SY: HCPCS | Performed by: OTOLARYNGOLOGY

## 2022-05-09 PROCEDURE — 31297 NSL/SINS NDSC SURG SPHN SINS: CPT | Performed by: OTOLARYNGOLOGY

## 2022-05-09 PROCEDURE — 3700000000 HC ANESTHESIA ATTENDED CARE: Performed by: OTOLARYNGOLOGY

## 2022-05-09 PROCEDURE — C1726 CATH, BAL DIL, NON-VASCULAR: HCPCS | Performed by: OTOLARYNGOLOGY

## 2022-05-09 PROCEDURE — 2720000010 HC SURG SUPPLY STERILE: Performed by: OTOLARYNGOLOGY

## 2022-05-09 PROCEDURE — 6370000000 HC RX 637 (ALT 250 FOR IP): Performed by: ANESTHESIOLOGY

## 2022-05-09 PROCEDURE — 31295 NSL/SINS NDSC SURG MAX SINS: CPT | Performed by: OTOLARYNGOLOGY

## 2022-05-09 PROCEDURE — 3600000013 HC SURGERY LEVEL 3 ADDTL 15MIN: Performed by: OTOLARYNGOLOGY

## 2022-05-09 PROCEDURE — C1894 INTRO/SHEATH, NON-LASER: HCPCS | Performed by: OTOLARYNGOLOGY

## 2022-05-09 PROCEDURE — 2709999900 HC NON-CHARGEABLE SUPPLY: Performed by: OTOLARYNGOLOGY

## 2022-05-09 PROCEDURE — 7100000010 HC PHASE II RECOVERY - FIRST 15 MIN: Performed by: OTOLARYNGOLOGY

## 2022-05-09 PROCEDURE — 7100000001 HC PACU RECOVERY - ADDTL 15 MIN: Performed by: OTOLARYNGOLOGY

## 2022-05-09 PROCEDURE — 2580000003 HC RX 258: Performed by: NURSE ANESTHETIST, CERTIFIED REGISTERED

## 2022-05-09 PROCEDURE — 7100000011 HC PHASE II RECOVERY - ADDTL 15 MIN: Performed by: OTOLARYNGOLOGY

## 2022-05-09 DEVICE — PROPEL MINI SINUS IMPLANT
Type: IMPLANTABLE DEVICE | Site: NOSE | Status: FUNCTIONAL
Brand: PROPEL MINI

## 2022-05-09 DEVICE — PROPEL CONTOUR SINUS IMPLANT
Type: IMPLANTABLE DEVICE | Site: NOSE | Status: FUNCTIONAL
Brand: PROPEL CONTOUR

## 2022-05-09 RX ORDER — ROCURONIUM BROMIDE 10 MG/ML
INJECTION, SOLUTION INTRAVENOUS PRN
Status: DISCONTINUED | OUTPATIENT
Start: 2022-05-09 | End: 2022-05-09 | Stop reason: SDUPTHER

## 2022-05-09 RX ORDER — GLYCOPYRROLATE 0.2 MG/ML
INJECTION INTRAMUSCULAR; INTRAVENOUS PRN
Status: DISCONTINUED | OUTPATIENT
Start: 2022-05-09 | End: 2022-05-09 | Stop reason: SDUPTHER

## 2022-05-09 RX ORDER — SODIUM CHLORIDE 9 MG/ML
INJECTION, SOLUTION INTRAVENOUS PRN
Status: DISCONTINUED | OUTPATIENT
Start: 2022-05-09 | End: 2022-05-09 | Stop reason: HOSPADM

## 2022-05-09 RX ORDER — ACETAMINOPHEN 500 MG
1000 TABLET ORAL ONCE
Status: COMPLETED | OUTPATIENT
Start: 2022-05-09 | End: 2022-05-09

## 2022-05-09 RX ORDER — DIAPER,BRIEF,INFANT-TODD,DISP
EACH MISCELLANEOUS PRN
Status: DISCONTINUED | OUTPATIENT
Start: 2022-05-09 | End: 2022-05-09 | Stop reason: ALTCHOICE

## 2022-05-09 RX ORDER — SODIUM CHLORIDE 0.9 % (FLUSH) 0.9 %
5-40 SYRINGE (ML) INJECTION EVERY 12 HOURS SCHEDULED
Status: DISCONTINUED | OUTPATIENT
Start: 2022-05-09 | End: 2022-05-09 | Stop reason: HOSPADM

## 2022-05-09 RX ORDER — OXYMETAZOLINE HYDROCHLORIDE 0.05 G/100ML
SPRAY NASAL PRN
Status: DISCONTINUED | OUTPATIENT
Start: 2022-05-09 | End: 2022-05-09 | Stop reason: ALTCHOICE

## 2022-05-09 RX ORDER — SODIUM CHLORIDE 0.9 % (FLUSH) 0.9 %
5-40 SYRINGE (ML) INJECTION PRN
Status: DISCONTINUED | OUTPATIENT
Start: 2022-05-09 | End: 2022-05-09 | Stop reason: HOSPADM

## 2022-05-09 RX ORDER — NEOSTIGMINE METHYLSULFATE 1 MG/ML
INJECTION, SOLUTION INTRAVENOUS PRN
Status: DISCONTINUED | OUTPATIENT
Start: 2022-05-09 | End: 2022-05-09 | Stop reason: SDUPTHER

## 2022-05-09 RX ORDER — OXYCODONE HYDROCHLORIDE 5 MG/1
10 TABLET ORAL PRN
Status: COMPLETED | OUTPATIENT
Start: 2022-05-09 | End: 2022-05-09

## 2022-05-09 RX ORDER — PROPOFOL 10 MG/ML
INJECTION, EMULSION INTRAVENOUS PRN
Status: DISCONTINUED | OUTPATIENT
Start: 2022-05-09 | End: 2022-05-09 | Stop reason: SDUPTHER

## 2022-05-09 RX ORDER — HYDROCODONE BITARTRATE AND ACETAMINOPHEN 5; 325 MG/1; MG/1
1 TABLET ORAL EVERY 6 HOURS PRN
Qty: 28 TABLET | Refills: 0 | Status: SHIPPED | OUTPATIENT
Start: 2022-05-09 | End: 2022-05-16

## 2022-05-09 RX ORDER — SODIUM CHLORIDE 9 MG/ML
INJECTION, SOLUTION INTRAVENOUS CONTINUOUS PRN
Status: DISCONTINUED | OUTPATIENT
Start: 2022-05-09 | End: 2022-05-09 | Stop reason: SDUPTHER

## 2022-05-09 RX ORDER — DEXAMETHASONE SODIUM PHOSPHATE 4 MG/ML
INJECTION, SOLUTION INTRA-ARTICULAR; INTRALESIONAL; INTRAMUSCULAR; INTRAVENOUS; SOFT TISSUE PRN
Status: DISCONTINUED | OUTPATIENT
Start: 2022-05-09 | End: 2022-05-09 | Stop reason: SDUPTHER

## 2022-05-09 RX ORDER — DIPHENHYDRAMINE HYDROCHLORIDE 50 MG/ML
12.5 INJECTION INTRAMUSCULAR; INTRAVENOUS
Status: DISCONTINUED | OUTPATIENT
Start: 2022-05-09 | End: 2022-05-09 | Stop reason: HOSPADM

## 2022-05-09 RX ORDER — LIDOCAINE HYDROCHLORIDE 20 MG/ML
INJECTION, SOLUTION INFILTRATION; PERINEURAL PRN
Status: DISCONTINUED | OUTPATIENT
Start: 2022-05-09 | End: 2022-05-09 | Stop reason: SDUPTHER

## 2022-05-09 RX ORDER — ONDANSETRON 4 MG/1
4 TABLET, ORALLY DISINTEGRATING ORAL EVERY 8 HOURS PRN
Qty: 15 TABLET | Refills: 0 | Status: SHIPPED | OUTPATIENT
Start: 2022-05-09

## 2022-05-09 RX ORDER — FENTANYL CITRATE 50 UG/ML
INJECTION, SOLUTION INTRAMUSCULAR; INTRAVENOUS PRN
Status: DISCONTINUED | OUTPATIENT
Start: 2022-05-09 | End: 2022-05-09 | Stop reason: SDUPTHER

## 2022-05-09 RX ORDER — LIDOCAINE HYDROCHLORIDE AND EPINEPHRINE 10; 10 MG/ML; UG/ML
INJECTION, SOLUTION INFILTRATION; PERINEURAL PRN
Status: DISCONTINUED | OUTPATIENT
Start: 2022-05-09 | End: 2022-05-09 | Stop reason: ALTCHOICE

## 2022-05-09 RX ORDER — MEPERIDINE HYDROCHLORIDE 25 MG/ML
12.5 INJECTION INTRAMUSCULAR; INTRAVENOUS; SUBCUTANEOUS EVERY 5 MIN PRN
Status: DISCONTINUED | OUTPATIENT
Start: 2022-05-09 | End: 2022-05-09 | Stop reason: HOSPADM

## 2022-05-09 RX ORDER — MIDAZOLAM HYDROCHLORIDE 1 MG/ML
INJECTION INTRAMUSCULAR; INTRAVENOUS PRN
Status: DISCONTINUED | OUTPATIENT
Start: 2022-05-09 | End: 2022-05-09 | Stop reason: SDUPTHER

## 2022-05-09 RX ORDER — OXYCODONE HYDROCHLORIDE 5 MG/1
5 TABLET ORAL PRN
Status: COMPLETED | OUTPATIENT
Start: 2022-05-09 | End: 2022-05-09

## 2022-05-09 RX ORDER — ONDANSETRON 2 MG/ML
INJECTION INTRAMUSCULAR; INTRAVENOUS PRN
Status: DISCONTINUED | OUTPATIENT
Start: 2022-05-09 | End: 2022-05-09 | Stop reason: SDUPTHER

## 2022-05-09 RX ADMIN — Medication 3 MG: at 10:42

## 2022-05-09 RX ADMIN — FENTANYL CITRATE 50 MCG: 50 INJECTION, SOLUTION INTRAMUSCULAR; INTRAVENOUS at 10:10

## 2022-05-09 RX ADMIN — SODIUM CHLORIDE: 9 INJECTION, SOLUTION INTRAVENOUS at 08:29

## 2022-05-09 RX ADMIN — SODIUM CHLORIDE: 9 INJECTION, SOLUTION INTRAVENOUS at 09:40

## 2022-05-09 RX ADMIN — OXYCODONE 5 MG: 5 TABLET ORAL at 12:06

## 2022-05-09 RX ADMIN — LIDOCAINE HYDROCHLORIDE 100 MG: 20 INJECTION, SOLUTION INFILTRATION; PERINEURAL at 09:41

## 2022-05-09 RX ADMIN — PROPOFOL 150 MG: 10 INJECTION, EMULSION INTRAVENOUS at 09:41

## 2022-05-09 RX ADMIN — ROCURONIUM BROMIDE 40 MG: 10 INJECTION, SOLUTION INTRAVENOUS at 09:41

## 2022-05-09 RX ADMIN — ACETAMINOPHEN 1000 MG: 500 TABLET ORAL at 08:08

## 2022-05-09 RX ADMIN — FENTANYL CITRATE 50 MCG: 50 INJECTION, SOLUTION INTRAMUSCULAR; INTRAVENOUS at 09:54

## 2022-05-09 RX ADMIN — MIDAZOLAM 2 MG: 1 INJECTION INTRAMUSCULAR; INTRAVENOUS at 09:34

## 2022-05-09 RX ADMIN — FENTANYL CITRATE 50 MCG: 50 INJECTION, SOLUTION INTRAMUSCULAR; INTRAVENOUS at 10:56

## 2022-05-09 RX ADMIN — GLYCOPYRROLATE 0.6 MG: 0.2 INJECTION INTRAMUSCULAR; INTRAVENOUS at 10:42

## 2022-05-09 RX ADMIN — ONDANSETRON 4 MG: 2 INJECTION INTRAMUSCULAR; INTRAVENOUS at 09:54

## 2022-05-09 RX ADMIN — DEXAMETHASONE SODIUM PHOSPHATE 10 MG: 4 INJECTION, SOLUTION INTRAMUSCULAR; INTRAVENOUS at 09:54

## 2022-05-09 RX ADMIN — FENTANYL CITRATE 100 MCG: 50 INJECTION, SOLUTION INTRAMUSCULAR; INTRAVENOUS at 09:40

## 2022-05-09 ASSESSMENT — PULMONARY FUNCTION TESTS
PIF_VALUE: 19
PIF_VALUE: 20
PIF_VALUE: 20
PIF_VALUE: 25
PIF_VALUE: 25
PIF_VALUE: 41
PIF_VALUE: 20
PIF_VALUE: 16
PIF_VALUE: 25
PIF_VALUE: 25
PIF_VALUE: 19
PIF_VALUE: 20
PIF_VALUE: 19
PIF_VALUE: 0
PIF_VALUE: 25
PIF_VALUE: 25
PIF_VALUE: 20
PIF_VALUE: 20
PIF_VALUE: 25
PIF_VALUE: 2
PIF_VALUE: 20
PIF_VALUE: 25
PIF_VALUE: 20
PIF_VALUE: 25
PIF_VALUE: 25
PIF_VALUE: 17
PIF_VALUE: 0
PIF_VALUE: 20
PIF_VALUE: 20
PIF_VALUE: 12
PIF_VALUE: 19
PIF_VALUE: 25
PIF_VALUE: 2
PIF_VALUE: 0
PIF_VALUE: 24
PIF_VALUE: 2
PIF_VALUE: 20
PIF_VALUE: 25
PIF_VALUE: 20
PIF_VALUE: 25
PIF_VALUE: 20
PIF_VALUE: 19
PIF_VALUE: 20
PIF_VALUE: 25
PIF_VALUE: 20
PIF_VALUE: 20
PIF_VALUE: 25
PIF_VALUE: 17
PIF_VALUE: 1
PIF_VALUE: 25
PIF_VALUE: 25
PIF_VALUE: 20
PIF_VALUE: 26
PIF_VALUE: 1
PIF_VALUE: 2
PIF_VALUE: 1
PIF_VALUE: 25
PIF_VALUE: 20
PIF_VALUE: 2
PIF_VALUE: 0
PIF_VALUE: 19
PIF_VALUE: 20
PIF_VALUE: 0
PIF_VALUE: 1
PIF_VALUE: 25
PIF_VALUE: 1
PIF_VALUE: 20
PIF_VALUE: 25
PIF_VALUE: 20
PIF_VALUE: 20
PIF_VALUE: 25
PIF_VALUE: 0
PIF_VALUE: 19
PIF_VALUE: 4
PIF_VALUE: 19
PIF_VALUE: 20
PIF_VALUE: 25

## 2022-05-09 ASSESSMENT — PAIN SCALES - GENERAL
PAINLEVEL_OUTOF10: 0
PAINLEVEL_OUTOF10: 0
PAINLEVEL_OUTOF10: 3
PAINLEVEL_OUTOF10: 4
PAINLEVEL_OUTOF10: 0
PAINLEVEL_OUTOF10: 0

## 2022-05-09 ASSESSMENT — PAIN DESCRIPTION - PAIN TYPE: TYPE: SURGICAL PAIN

## 2022-05-09 NOTE — ANESTHESIA PRE PROCEDURE
Department of Anesthesiology  Preprocedure Note       Name:  Judeth Seip   Age:  40 y.o.  :  1985                                          MRN:  [id number]         Date:  2022      Surgeon: Mando Barreto):  Rama Hewitt DO    Procedure: Procedure(s):  FUNCTIONAL ENDOSCOPIC SINUS SURGERY SUBMUCOSAL RESECTION INFERIOR TURBINATES  ++LATEX ALLERGY++    Medications prior to admission:   Prior to Admission medications    Medication Sig Start Date End Date Taking? Authorizing Provider   fluticasone (FLONASE) 50 MCG/ACT nasal spray 2 sprays by Each Nostril route daily 3/30/22   Yang Rickey Janiya, DO   FLUoxetine (PROZAC) 20 MG capsule Take 1 capsule by mouth daily STOP prior prescription. New prescription reflects dose increase 22   Yang Courtland Janiya, DO   azelastine (ASTELIN) 0.1 % nasal spray 2 sprays by Nasal route 2 times daily Use in each nostril as directed 2/15/22   Geovannig T Valerie , DO   montelukast (SINGULAIR) 10 MG tablet Take 1 tablet by mouth daily 22   Yang Courtland Janiya, DO   Misc.  Devices (NASAL SPRAY BOTTLE) MISC by Does not apply route    Historical Provider, MD   clobetasol (TEMOVATE) 0.05 % external solution apply to scalp THREE NIGHTS A WEEK IF NEEDED 21   Historical Provider, MD   fexofenadine-pseudoephedrine (ALLEGRA-D 24HR) 180-240 MG per extended release tablet Take 1 tablet by mouth daily 21   Yang Courtland Janiya, DO   diclofenac sodium (VOLTAREN) 1 % GEL apply 4 grams topically four times a day 20   Littleton L Belcik, DO   ELDERBERRY PO Take by mouth     Historical Provider, MD       Current medications:    Current Facility-Administered Medications   Medication Dose Route Frequency Provider Last Rate Last Admin    sodium chloride flush 0.9 % injection 5-40 mL  5-40 mL IntraVENous 2 times per day Brody Valdez, DO        sodium chloride flush 0.9 % injection 5-40 mL  5-40 mL IntraVENous PRN Brody Valdez, DO        0.9 % sodium chloride infusion   IntraVENous PRN Nighat Velasquez DO           Allergies: Allergies   Allergen Reactions    Bee Venom     Cephalexin Diarrhea    Cephalosporins     Morphine      Violent         Problem List:  There is no problem list on file for this patient. Past Medical History:        Diagnosis Date    Allergic rhinitis     Anxiety     Chronic sinusitis     Lactose intolerance     MONALISA on CPAP        Past Surgical History:        Procedure Laterality Date    ANTERIOR CRUCIATE LIGAMENT REPAIR      COLONOSCOPY      ECHOCARDIOGRAM EXERCISE STRESS TEST  11/06/2013         TONSILLECTOMY         Social History:    Social History     Tobacco Use    Smoking status: Never Smoker    Smokeless tobacco: Never Used   Substance Use Topics    Alcohol use: Yes     Comment: 2-3 beers per day                                Counseling given: Not Answered      Vital Signs (Current):   Vitals:    05/03/22 1144   Weight: 185 lb (83.9 kg)   Height: 5' 6\" (1.676 m)                                              BP Readings from Last 3 Encounters:   03/15/22 (!) 131/93   02/16/22 122/86   02/15/22 (!) 121/95       NPO Status:                                                                                 BMI:   Wt Readings from Last 3 Encounters:   05/03/22 185 lb (83.9 kg)   03/15/22 181 lb (82.1 kg)   02/16/22 189 lb (85.7 kg)     Body mass index is 29.86 kg/m².     CBC:   Lab Results   Component Value Date    WBC 5.6 01/29/2021    RBC 4.99 01/29/2021    HGB 14.8 01/29/2021    HCT 45.4 01/29/2021    MCV 91.0 01/29/2021    RDW 12.1 01/29/2021     01/29/2021       CMP:   Lab Results   Component Value Date     01/29/2021    K 4.2 01/29/2021     01/29/2021    CO2 29 01/29/2021    BUN 14 01/29/2021    CREATININE 1.2 01/29/2021    GFRAA >60 01/29/2021    LABGLOM >60 01/29/2021    GLUCOSE 99 01/29/2021    PROT 7.0 01/29/2021    CALCIUM 9.4 01/29/2021    BILITOT 1.2 01/29/2021    ALKPHOS 50 01/29/2021    AST 19 01/29/2021    ALT 29 01/29/2021       POC Tests: No results for input(s): POCGLU, POCNA, POCK, POCCL, POCBUN, POCHEMO, POCHCT in the last 72 hours. Coags: No results found for: PROTIME, INR, APTT    HCG (If Applicable): No results found for: PREGTESTUR, PREGSERUM, HCG, HCGQUANT     ABGs: No results found for: PHART, PO2ART, WIR0CQZ, TSR2XNX, BEART, M8LBDAAA     Type & Screen (If Applicable):  No results found for: LABABO, LABRH    Drug/Infectious Status (If Applicable):  No results found for: HIV, HEPCAB    COVID-19 Screening (If Applicable): No results found for: COVID19        Anesthesia Evaluation  Patient summary reviewed no history of anesthetic complications:   Airway: Mallampati: II  TM distance: >3 FB   Neck ROM: full   Dental:          Pulmonary: breath sounds clear to auscultation  (+) sleep apnea: on CPAP,                            ROS comment: Allergic rhinitis  Chronic sinusitis   Cardiovascular:Negative CV ROS            Rhythm: regular  Rate: normal           Beta Blocker:  Not on Beta Blocker         Neuro/Psych:   (+) depression/anxiety             GI/Hepatic/Renal: Neg GI/Hepatic/Renal ROS            Endo/Other: Negative Endo/Other ROS                    Abdominal:             Vascular: negative vascular ROS. Other Findings:           Anesthesia Plan      general     ASA 3       Induction: intravenous. MIPS: Postoperative opioids intended and Prophylactic antiemetics administered. Anesthetic plan and risks discussed with patient. Plan discussed with CRNA. Clarita Kiser MD   5/9/2022    Chart reviewed, patient seen. Agree with above assessment.     NAKITA Mccarty - CRNA  5/9/22  08:06 AM

## 2022-05-09 NOTE — ANESTHESIA POSTPROCEDURE EVALUATION
Department of Anesthesiology  Postprocedure Note    Patient: Sue Prasad  MRN: [id number]  YOB: 1985  Date of evaluation: 5/9/2022  Time:  12:35 PM     Procedure Summary     Date: 05/09/22 Room / Location: [facility] OR 01 / [facility]    Anesthesia Start: 0930 Anesthesia Stop: 1101    Procedure: FUNCTIONAL ENDOSCOPIC SINUS SURGERY SUBMUCOSAL RESECTION INFERIOR TURBINATES (Bilateral Nose) Diagnosis: (CHRONIC SINUSITIS)    Surgeons: Bruce Sever, DO Responsible Provider: Pearl Wren MD    Anesthesia Type: general ASA Status: 3          Anesthesia Type: general    Gloria Phase I: Gloria Score: 10    Gloria Phase II: Gloria Score: 10    Last vitals: Reviewed and per EMR flowsheets.        Anesthesia Post Evaluation    Patient location during evaluation: PACU  Patient participation: complete - patient participated  Level of consciousness: awake and alert  Pain score: 0  Airway patency: patent  Nausea & Vomiting: no vomiting and no nausea  Complications: no  Cardiovascular status: hemodynamically stable  Respiratory status: acceptable and spontaneous ventilation  Hydration status: stable

## 2022-05-09 NOTE — OP NOTE
Operative Note      Patient: Kim Mcnair  YOB: 1985  MRN: [id number]    Date of Procedure: 5/9/2022    Pre-Op Diagnosis: CHRONIC SINUSITIS    Post-Op Diagnosis: Same       Procedure(s):  FUNCTIONAL ENDOSCOPIC SINUS SURGERY SUBMUCOSAL RESECTION INFERIOR TURBINATES    Surgeon(s):  Paige Nelson DO    Assistant:   Resident: Adam Zarate DO    Anesthesia: General    Estimated Blood Loss (mL): less than 50     Complications: None    Specimens:   * No specimens in log *    Implants:  Implant Name Type Inv. Item Serial No.  Lot No. LRB No. Used Action   STENT NSL L16MM [id number] 370UG MINI MOMETASONE Malini Christen  STENT NSL L16MM DIA4MM 370UG MINI MOMETASONE FUROATE LO  INTERSECT ENT-WD [id number]  2 Implanted   IMPL SINUS RELEASE PROPEL CONTOUR - [id number] Face/Chin/Dental/Voice IMPL SINUS RELEASE PROPEL CONTOUR  INTERSECT ENT-PMM [id number]  2 Implanted         Drains: * No LDAs found *    Findings: bilateral enlarged inferior turbinates; see below    Detailed Description of Procedure:   Kim Mcnair  5/9/2022  [id number]    Pre-operative Diagnosis: Chronic sinusitis    Post-operative Diagnosis: same    Procedure: Functional endoscopic sinus surgery with bilateral maxillary, frontal and sphenoid antrostomies, submucous resection of inferior turbinates      Anesthesia: General endotracheal anesthesia    Surgeons/Assistants: Marcelina    Estimated Blood Loss: less than 50     Complications: None    Specimens: was not Obtained    Indications: Kim Mcnair is a 40 y.o. male who presents for sinus surgery due to chronic sinus illness with failure of medical management      Prep  The patient was consented preoperatively and taken back to the operating room, identified appropriately, placed in the supine position, given anesthesia for general intubation. The patient was intubated. They were prepped and draped in a sterile fashion.  Initial prep for the nose was 4% cocaine pledgets placed into both nasal cavities and the patient's nasal walls medial and lateral along the septal line and then along the inferior turbinate were injected with approximately 2mL of 1% lidocaine with epinephrine. That was total for both sides. The pledgets were allowed to sit for approximately 5 minutes, while the rest of the patient's prep was done. Injection  The 30 degree endoscope was place into the left nostril along with a 10cc syringe with a spinal needle. The anterior root of the middle turbinate was injected with 2cc of 1% lidocaine with epinephrine. The right anterior root was also injected with the same amount. The posterior root of the middle turbinates were then addressed by injecting both sides with 2cc of 1% lidocain with epinephrine. Sphenoid  LEFT:  Once the patient was properly set up, the pledgets were taken out of the nose and a 30-degree endoscope was placed in the patient's left nasal cavity. Moving from a posterior to anterior position, the first sinus that was addressed was the sphenoid sinus. The 30-degree endoscope was placed back to the area of the supreme turbinate along the posterior nasopharyngeal wall approximately 1 cm above the nasal choanae on the left side was seen the beginning of the sphenoid sinus. This was somewhat atretic and the sinus was maybe 1 to 2 mm at best. Using the [employer] functional sphenoid guide, the guide was placed along the same path as the endoscope and placed right up to the area where the sphenoid sinus could be seen. The lighted guidewire was then placed into the sphenoid sinus with manipulation. Once it was in the sphenoid sinus, a 6-mm balloon was then allowed to pass over the area of the sphenoid sinus until the balloon straddled both sides of the sinus ostium. The balloon was then taken up to 12 mmHg pressure with water. This was allowed to sit for 3 seconds and was then taken down.  The resulting ostium after balloon dilation of the sphenoid sinus was very large and allowed for visualization of the sphenoid sinus itself. This area was irrigated out with 180 ml of Saline. Then suction to remove residual irrigation. The endoscope and balloon were then removed    RIGHT:  The 30-degree endoscope was then placed in the patient's right nasal cavity. Moving from a posterior to anterior position, the first sinus that was addressed was the sphenoid sinus. The 30-degree endoscope was placed back to the area of the supreme turbinate along the posterior nasopharyngeal wall approximately 1 cm above the nasal choanae on the left side was seen the beginning of the sphenoid sinus. This was somewhat atretic and the sinus was maybe 1 to 2 mm at best. Using the [employer] functional sphenoid guide, the guide was placed along the same path as the endoscope and placed right up to the area where the sphenoid sinus could be seen. The lighted guidewire was then placed into the sphenoid sinus with manipulation. Once it was in the sphenoid sinus, a 6-mm balloon was then allowed to pass over the area of the sphenoid sinus until the balloon straddled both sides of the sinus ostium. The balloon was then taken up to 12 mmHg pressure with water. This was allowed to sit for 3 seconds and was then taken down. The resulting ostium after balloon dilation of the sphenoid sinus was very large and allowed for visualization of the sphenoid sinus itself. This area was irrigated out with 180 ml of Saline. Then suction to remove residual irrigation. The endoscope and balloon were then removed    Frontals  LEFT:  Attention was then turned to the frontals starting with the left side. Using the [place], the patient's middle turbinate was medialized until the ethmoid and uncinate process were in full view.  Injections were placed at the root of the middle turbinate and approximately 1 to 2 mL were placed in the root of the middle turbinate for anesthesia as well as to control bleeding. The frontal sinus guide from [employer] was then used to find the patient's   frontal sinus. The guidewire was used and I was visually able to pass the guidewire into the patient's frontal sinus on the left side. This was clearly visible as the light was found just under the patient's brow in the area of the frontal sinus. A guidewire was left in place. The balloon was pushed over it until the balloon was straddling the frontal recess. The 6-mm balloon was then used to dilate, being dilated up to 12 mL of water pressure. This was held for 3 seconds and removed. There was a second more inferior   inflation to open up the frontal recess further. Once the frontal sinus was opened, it was visually examined and it was found to be large wide opening. The frontal recess was then irrigated out with 180 mL of normal saline to clear out the frontal sinus. The balloon was then retracted and the frontal sinus seeker was withdrawn. Maxillary   LEFT:  With the maxillary sinuses, starting on the left side, the patient was not found to have an accessory os. The patient was not also found to have a jason bullosa which was not reduced to allow access into the Osteomeatal complex. The maxillary sinus guidewire was used to go to the posterior aspect of the uncinate. The tip of the guidewire was placed just lateral to the uncinate process and the guidewire was advanced. Once the guidewire was placed across the ostium, visualization was found by light in the left maxillary sinus. The balloon was advanced over the ostium and on the inflation to 12 mm of pressure. The balloon did open up the uncinate process as well and the bowing of this uncinate process medially showed that we were in the appropriate position. This was allowed to hold for 3 seconds and then taken down. The patient's sinus wasthen irrigated out as well with 180 mL of normal saline and then the balloon and guidewire were withdrawn.  The maxillary seeker was removed from the nose. RIGHT:  With the maxillary sinuses, starting on the right side, the patient was not found to have an accessory os. The patient was not also found to have a jason bullosa which was not reduced to allow access into the Osteomeatal complex. The maxillary sinus guidewire was used to go to the posterior aspect of the uncinate. The tip of the guidewire was placed just lateral to the uncinate process and the guidewire was advanced. Once the guidewire was placed across the ostium, visualization was found by light in the right maxillary sinus. The balloon was advanced over the ostium and on the inflation to 12 mm of pressure. The balloon did open up the uncinate process as well and the bowing of this uncinate process medially showed that we were in the appropriate position. This was allowed to hold for 3 seconds and then taken down. The patient's sinus was then irrigated out as well with 180 mL of normal saline and then the balloon and guidewire were withdrawn. The maxillary seeker was removed from the nose. The bilateral inferior turbinates were medialized with a boies elevator. A syringe with saline and a 22 gauge spinal needle was then used to inject the left turbinate mucosa to allow proper coblation. Using an arthrocare Reflex wand, a small puncture hole was made with the instrument in the anterior portion of the left inferior turbinate. The instrument was advanced along the bone of the turbinate, elevating the periostium from the mucosa. The mucosa was then ablated until a thin mucosal layer was left over the bone. Multiple sites were made going from superior to inferior. The turbinate was then lateralized with a boies elevator. Attention was turned to the right side.  A syringe with saline and a 22 gauge spinal needle was then used to inject the right turbinate mucosa to allow proper coblationUsing an arthrocare Reflex wand, a small puncture hole was made with the instrument in the anterior portion of the left inferior turbinate. The instrument was advanced along the bone of the turbinate, elevating the periostium from the mucosa. The mucosa was then ablated until a thin mucosal layer was left over the bone. Multiple sites were made going from superior to inferior. The turbinate was then lateralized again with a boies elevator. Anterior Ethmoidectomy- RIGHT  On the right side, the ethmoid bulla was visualized and entered with a 10 suction. Di Karlos was used to clear the anterior ethmoid cells to provide a drainage pathway and give room for a propel stent. Propel implant  LEFT   At the completion of these procedures, the patient's middle turbinate was medialized with a Propel Contour and Mini Middle turbinate implant to hold the middle turbinate open. RIGHT  At the completion of these procedures, the patient's middle turbinate was medialized with a Propel Contour and Mini Middle turbinate implant to hold the middle turbinate open. Triplett splints were then placed in the nose and sewn through septum. The middle turbinates were then packed with a Sinu-Foam gel. The patient was turned back to Anesthesia for appropriate awakening. Dr. Ruth Castillo was present the entire case.       Electronically signed by Valerio Amanda DO on 5/9/2022 at 10:59 AM

## 2022-05-09 NOTE — H&P
Yesi Garcia was seen and re-examined preoperatively today, May 9, 2022. There was no substantial change in his physical and medical status. Patient is fit for the proposed surgical procedure. All questions were appropriately addressed and had no further questions regarding the risks, benefits, and alternatives of the procedure. Yesi Garcia and family wished to proceed.     Fidelina Mckeon DO  Resident Physician  [facility]  Otolaryngology Residency  5/9/2022  9:08 AM

## 2022-05-09 NOTE — ANESTHESIA POSTPROCEDURE EVALUATION
Department of Anesthesiology  Postprocedure Note    Patient: Segundo Díaz  MRN: [id number]  YOB: 1985  Date of evaluation: 5/9/2022  Time:  11:53 AM     Procedure Summary     Date: 05/09/22 Room / Location: [facility] OR 01 / [facility]    Anesthesia Start: 0930 Anesthesia Stop: 1101    Procedure: FUNCTIONAL ENDOSCOPIC SINUS SURGERY SUBMUCOSAL RESECTION INFERIOR TURBINATES (Bilateral Nose) Diagnosis: (CHRONIC SINUSITIS)    Surgeons: Bradford Giordano DO Responsible Provider: Ciro Tabor MD    Anesthesia Type: general ASA Status: 3          Anesthesia Type: general    Gloria Phase I: Gloria Score: 10    Gloria Phase II: Gloria Score: 10    Last vitals: Reviewed and per EMR flowsheets.        Anesthesia Post Evaluation    Patient location during evaluation: PACU  Patient participation: complete - patient participated  Level of consciousness: awake and alert  Airway patency: patent  Nausea & Vomiting: no nausea and no vomiting  Complications: no  Cardiovascular status: hemodynamically stable  Respiratory status: acceptable  Hydration status: euvolemic  Multimodal analgesia pain management approach

## 2022-05-09 NOTE — PROGRESS NOTES
CLINICAL PHARMACY NOTE: MEDS TO BEDS    Total # of Prescriptions Filled: 2   The following medications were delivered to the patient:  · Ondansetron 4 mg ODT  · norco 5-325 mg    Additional Documentation:

## 2022-05-17 ENCOUNTER — OFFICE VISIT (OUTPATIENT)
Dept: ENT CLINIC | Age: 37
End: 2022-05-17

## 2022-05-17 VITALS — HEIGHT: 66 IN | WEIGHT: 185 LBS | BODY MASS INDEX: 29.73 KG/M2

## 2022-05-17 DIAGNOSIS — Z98.890 S/P FESS (FUNCTIONAL ENDOSCOPIC SINUS SURGERY): Primary | ICD-10-CM

## 2022-05-17 PROCEDURE — 99024 POSTOP FOLLOW-UP VISIT: CPT | Performed by: OTOLARYNGOLOGY

## 2022-05-17 NOTE — PROGRESS NOTES
medical problems as per  resident note. Patient seen and examined. Agree with above exam, assessment and plan.       Electronically signed by Patricia Cramer DO on 5/27/22 at 9:22 AM EDT

## 2022-06-29 ENCOUNTER — OFFICE VISIT (OUTPATIENT)
Dept: PODIATRY | Age: 37
End: 2022-06-29
Payer: COMMERCIAL

## 2022-06-29 DIAGNOSIS — M72.2 PLANTAR FASCIAL FIBROMATOSIS: ICD-10-CM

## 2022-06-29 DIAGNOSIS — M79.671 RIGHT FOOT PAIN: Primary | ICD-10-CM

## 2022-06-29 PROCEDURE — 99213 OFFICE O/P EST LOW 20 MIN: CPT | Performed by: PODIATRIST

## 2022-06-29 PROCEDURE — 20550 NJX 1 TENDON SHEATH/LIGAMENT: CPT | Performed by: PODIATRIST

## 2022-06-29 RX ORDER — BETAMETHASONE SODIUM PHOSPHATE AND BETAMETHASONE ACETATE 3; 3 MG/ML; MG/ML
6 INJECTION, SUSPENSION INTRA-ARTICULAR; INTRALESIONAL; INTRAMUSCULAR; SOFT TISSUE ONCE
Status: COMPLETED | OUTPATIENT
Start: 2022-06-29 | End: 2022-06-29

## 2022-06-29 RX ORDER — LIDOCAINE HYDROCHLORIDE 10 MG/ML
1 INJECTION, SOLUTION INFILTRATION; PERINEURAL ONCE
Status: COMPLETED | OUTPATIENT
Start: 2022-06-29 | End: 2022-06-29

## 2022-06-29 RX ORDER — DEXAMETHASONE SODIUM PHOSPHATE 4 MG/ML
4 INJECTION, SOLUTION INTRA-ARTICULAR; INTRALESIONAL; INTRAMUSCULAR; INTRAVENOUS; SOFT TISSUE ONCE
Status: COMPLETED | OUTPATIENT
Start: 2022-06-29 | End: 2022-06-29

## 2022-06-29 RX ADMIN — LIDOCAINE HYDROCHLORIDE 1 ML: 10 INJECTION, SOLUTION INFILTRATION; PERINEURAL at 16:19

## 2022-06-29 RX ADMIN — BETAMETHASONE SODIUM PHOSPHATE AND BETAMETHASONE ACETATE 6 MG: 3; 3 INJECTION, SUSPENSION INTRA-ARTICULAR; INTRALESIONAL; INTRAMUSCULAR; SOFT TISSUE at 16:21

## 2022-06-29 RX ADMIN — DEXAMETHASONE SODIUM PHOSPHATE 4 MG: 4 INJECTION, SOLUTION INTRA-ARTICULAR; INTRALESIONAL; INTRAMUSCULAR; INTRAVENOUS; SOFT TISSUE at 16:20

## 2022-06-29 NOTE — PROGRESS NOTES
Patient in office with c/o right foot pain. States pain has progressively gotten worse over the past few weeks. Did get OTC inserts and new work boots. Continues to have pain to heel and arch of right foot. CC:    Pain right heel  History of plantar fasciitis    HPI:   Presents today for pain right heel. History of plantar fasciitis. States he has been tolerating Mobic well but has had recurrence over the past few weeks with worsening symptoms on bottom right heel. Does work on his feet and does walk significant mount during the day. Does have inserts in his shoes. Would like to discuss cortisone injection today. No calf pain. Pain is worse in the morning but does have symptoms all day. Radiographs right foot obtained today. ROS:  Const: Denies constitutional symptoms  Musculo: Denies symptoms other than stated above  Skin: Denies symptoms other than stated above       Current Outpatient Medications:     ondansetron (ZOFRAN ODT) 4 MG disintegrating tablet, Place 1 tablet under the tongue every 8 hours as needed for Nausea or Vomiting, Disp: 15 tablet, Rfl: 0    fluticasone (FLONASE) 50 MCG/ACT nasal spray, 2 sprays by Each Nostril route daily, Disp: 16 g, Rfl: 5    FLUoxetine (PROZAC) 20 MG capsule, Take 1 capsule by mouth daily STOP prior prescription. New prescription reflects dose increase, Disp: 90 capsule, Rfl: 1    azelastine (ASTELIN) 0.1 % nasal spray, 2 sprays by Nasal route 2 times daily Use in each nostril as directed, Disp: 120 mL, Rfl: 1    montelukast (SINGULAIR) 10 MG tablet, Take 1 tablet by mouth daily, Disp: 90 tablet, Rfl: 3    Misc.  Devices (NASAL SPRAY BOTTLE) MISC, by Does not apply route, Disp: , Rfl:     clobetasol (TEMOVATE) 0.05 % external solution, apply to scalp THREE NIGHTS A WEEK IF NEEDED, Disp: , Rfl:     fexofenadine-pseudoephedrine (ALLEGRA-D 24HR) 180-240 MG per extended release tablet, Take 1 tablet by mouth daily, Disp: 30 tablet, Rfl: 2    diclofenac sodium (VOLTAREN) 1 % GEL, apply 4 grams topically four times a day, Disp: 200 g, Rfl: 1    ELDERBERRY PO, Take by mouth , Disp: , Rfl:   Allergies   Allergen Reactions    Bee Venom     Cephalexin Diarrhea    Cephalosporins     Morphine      Violent         Past Medical History:   Diagnosis Date    Allergic rhinitis     Anxiety     Chronic sinusitis     Lactose intolerance     MONALISA on CPAP            There were no vitals filed for this visit. Work History/Social History: Foot and ankle history:     Focused Lower Extremity Physical Exam:    Neurovascular examination:    Dorsalis Pedis palpable bilateral.  Posterior tibialis palpable bilateral.    Capillary Refill Time:  Immediate return  Hair growth:  Symmetrical and bilateral   Skin:  Not atrophic  Edema: No edema bilateral feet or ankles. Neurologic:  Light touch intact bilateral.    Negative Tinel's. Musculoskeletal/ Orthopedic examination:    Equinis: Absent bilateral  Dorsiflexion, plantarflexion, inversion, eversion bilateral 5 out of 5 muscle strength  Wiggling toes  Negative Homans  No pain left foot  Tenderness to palpation medial band right plantar fascia. Negative calcaneal squeeze test   There is tenderness with windlass mechanism medial band right plantar fascia. Dermatology examination:    No open skin lesions or abrasions bilateral lower extremity. Assessment and Plan:  Luiz Jama was seen today for foot pain. Diagnoses and all orders for this visit:    Right foot pain  -     XR FOOT RIGHT (MIN 3 VIEWS);  Future  -     betamethasone acetate-betamethasone sodium phosphate (CELESTONE) injection 6 mg  -     dexamethasone (DECADRON) injection 4 mg  -     lidocaine 1 % injection 1 mL    Plantar fascial fibromatosis  -     betamethasone acetate-betamethasone sodium phosphate (CELESTONE) injection 6 mg  -     dexamethasone (DECADRON) injection 4 mg  -     lidocaine 1 % injection 1 mL      Seen today for follow-up plantar fasciitis right. Radiographs right foot reviewed. No acute fracture dislocations. Clinically presents as Planter fasciitis with majority of tenderness on the medial band right plantar fascia. Continue inserts in his shoes. Potential risks, complications, alternative treatments, and procedure prognosis were explained to the patient. Verbal informed consent was obtained from the patient. Betadine and alcohol preparation was performed over left plantar fascia. Ethyl chloride used over injection site  I next used a sterile 3 mL syringe with 25-gauge needle with 1 mL 1% lidocaine plain, 1 mL 1% betamethasone and 1 mL dexamethasone injected in standard medial approach left plantar fascia. Patient tolerated corticosteroid injection well. Band-Aid applied. The patient was educated on a possible steroid flare and the use of ice with frozen water bottle 10 minutes each night discussed. Continue passive and active range of motion stretches and strengthening bilateral plantar fascia and Achilles tendons. Avoid barefoot  I did recommend Powerstep Original full-length over-the-counter orthotics. If continued symptoms I did discuss follow-up in 1 month. He will call for follow-up. Return if symptoms worsen or fail to improve. Seen By:  Larisa Felix DPM      Document was created using voice recognition software. Note was reviewed, however may contain grammatical errors.

## 2022-07-27 ENCOUNTER — OFFICE VISIT (OUTPATIENT)
Dept: PODIATRY | Age: 37
End: 2022-07-27
Payer: COMMERCIAL

## 2022-07-27 VITALS — HEIGHT: 66 IN | WEIGHT: 185 LBS | BODY MASS INDEX: 29.73 KG/M2

## 2022-07-27 DIAGNOSIS — M79.671 RIGHT FOOT PAIN: Primary | ICD-10-CM

## 2022-07-27 DIAGNOSIS — M72.2 PLANTAR FASCIAL FIBROMATOSIS: ICD-10-CM

## 2022-07-27 PROCEDURE — 99213 OFFICE O/P EST LOW 20 MIN: CPT | Performed by: PODIATRIST

## 2022-07-27 PROCEDURE — 20550 NJX 1 TENDON SHEATH/LIGAMENT: CPT | Performed by: PODIATRIST

## 2022-07-27 RX ORDER — LIDOCAINE HYDROCHLORIDE 10 MG/ML
1 INJECTION, SOLUTION INFILTRATION; PERINEURAL ONCE
Status: COMPLETED | OUTPATIENT
Start: 2022-07-27 | End: 2022-07-27

## 2022-07-27 RX ORDER — DEXAMETHASONE SODIUM PHOSPHATE 4 MG/ML
4 INJECTION, SOLUTION INTRA-ARTICULAR; INTRALESIONAL; INTRAMUSCULAR; INTRAVENOUS; SOFT TISSUE ONCE
Status: COMPLETED | OUTPATIENT
Start: 2022-07-27 | End: 2022-07-27

## 2022-07-27 RX ORDER — MELOXICAM 15 MG/1
15 TABLET ORAL DAILY
Qty: 30 TABLET | Refills: 1 | Status: SHIPPED
Start: 2022-07-27 | End: 2022-09-22

## 2022-07-27 RX ORDER — BETAMETHASONE SODIUM PHOSPHATE AND BETAMETHASONE ACETATE 3; 3 MG/ML; MG/ML
6 INJECTION, SUSPENSION INTRA-ARTICULAR; INTRALESIONAL; INTRAMUSCULAR; SOFT TISSUE ONCE
Status: COMPLETED | OUTPATIENT
Start: 2022-07-27 | End: 2022-07-27

## 2022-07-27 RX ADMIN — DEXAMETHASONE SODIUM PHOSPHATE 4 MG: 4 INJECTION, SOLUTION INTRA-ARTICULAR; INTRALESIONAL; INTRAMUSCULAR; INTRAVENOUS; SOFT TISSUE at 16:06

## 2022-07-27 RX ADMIN — BETAMETHASONE SODIUM PHOSPHATE AND BETAMETHASONE ACETATE 6 MG: 3; 3 INJECTION, SUSPENSION INTRA-ARTICULAR; INTRALESIONAL; INTRAMUSCULAR; SOFT TISSUE at 16:04

## 2022-07-27 RX ADMIN — LIDOCAINE HYDROCHLORIDE 1 ML: 10 INJECTION, SOLUTION INFILTRATION; PERINEURAL at 16:06

## 2022-07-27 NOTE — PROGRESS NOTES
Patient in office to follow up with right foot pain. Continues to have pain. States foot was better for about 5 days after the injection he received at last apt then pain returned. CC:    Follow-up right heel pain and history of plantar fasciitis    HPI:   Paulette Baeza presents today right heel pain follow-up. Responded well for approximate 1 week after cortisone injection last visit. States has had recurrent symptoms over the past few weeks. Does have over-the-counter orthotics which he does wear which do seem to help some. Still having tenderness worse in the morning and at the end the day. Would like to discuss repeat cortisone injection today. No additional pedal complaints. ROS:  Const: Denies constitutional symptoms  Musculo: Denies symptoms other than stated above  Skin: Denies symptoms other than stated above       Current Outpatient Medications:     meloxicam (MOBIC) 15 MG tablet, Take 1 tablet by mouth in the morning for 30 doses. , Disp: 30 tablet, Rfl: 1    ondansetron (ZOFRAN ODT) 4 MG disintegrating tablet, Place 1 tablet under the tongue every 8 hours as needed for Nausea or Vomiting, Disp: 15 tablet, Rfl: 0    fluticasone (FLONASE) 50 MCG/ACT nasal spray, 2 sprays by Each Nostril route daily, Disp: 16 g, Rfl: 5    FLUoxetine (PROZAC) 20 MG capsule, Take 1 capsule by mouth daily STOP prior prescription. New prescription reflects dose increase, Disp: 90 capsule, Rfl: 1    azelastine (ASTELIN) 0.1 % nasal spray, 2 sprays by Nasal route 2 times daily Use in each nostril as directed, Disp: 120 mL, Rfl: 1    montelukast (SINGULAIR) 10 MG tablet, Take 1 tablet by mouth daily, Disp: 90 tablet, Rfl: 3    Misc.  Devices (NASAL SPRAY BOTTLE) MISC, by Does not apply route, Disp: , Rfl:     clobetasol (TEMOVATE) 0.05 % external solution, apply to scalp THREE NIGHTS A WEEK IF NEEDED, Disp: , Rfl:     fexofenadine-pseudoephedrine (ALLEGRA-D 24HR) 180-240 MG per extended release tablet, Take 1 tablet by mouth daily, Disp: 30 tablet, Rfl: 2    diclofenac sodium (VOLTAREN) 1 % GEL, apply 4 grams topically four times a day, Disp: 200 g, Rfl: 1    ELDERBERRY PO, Take by mouth , Disp: , Rfl:   Allergies   Allergen Reactions    Bee Venom     Cephalexin Diarrhea    Cephalosporins     Morphine      Violent         Past Medical History:   Diagnosis Date    Allergic rhinitis     Anxiety     Chronic sinusitis     Lactose intolerance     MONALISA on CPAP            Vitals:    07/27/22 1523   Weight: 185 lb (83.9 kg)   Height: 5' 6\" (1.676 m)       Work History/Social History: Foot and ankle history:     Focused Lower Extremity Physical Exam:    Neurovascular examination:    Dorsalis Pedis palpable bilateral.  Posterior tibialis palpable bilateral.    Capillary Refill Time:  Immediate return  Hair growth:  Symmetrical and bilateral   Skin:  Not atrophic  Edema: No edema bilateral feet or ankles. Neurologic:  Light touch intact bilateral.    Negative Tinel's. Musculoskeletal/ Orthopedic examination:    Equinis: Absent bilateral  Dorsiflexion, plantarflexion, inversion, eversion bilateral 5 out of 5 muscle strength  Wiggling toes  Negative Homans. Negative Burger. Tenderness to palpation medial band right plantar fascia. Negative calcaneal squeeze test.  Tenderness with windlass mechanism medial band right plantar fascia. Dermatology examination:    No open skin lesions or abrasions bilateral lower extremity. Assessment and Plan:  Ez Alonzo was seen today for follow-up and foot pain.     Diagnoses and all orders for this visit:    Right foot pain  -     betamethasone acetate-betamethasone sodium phosphate (CELESTONE) injection 6 mg  -     dexamethasone (DECADRON) injection 4 mg  -     lidocaine 1 % injection 1 mL    Plantar fascial fibromatosis  -     betamethasone acetate-betamethasone sodium phosphate (CELESTONE) injection 6 mg  -     dexamethasone (DECADRON) injection 4 mg  -     lidocaine 1 % injection 1 mL    Other orders  -     meloxicam (MOBIC) 15 MG tablet; Take 1 tablet by mouth in the morning for 30 doses. Follow-up plantar fasciitis right. Recurrent symptoms right heel pain. Clinically still presents as plantar fasciitis. Mobic 15 mg take once daily as directed. Risk and benefits of anti-inflammatories discussed in detail. Potential risks, complications, alternative treatments, and procedure prognosis were explained to the patient. Verbal informed consent was obtained from the patient. Betadine and alcohol preparation was performed over plantar fascia. Ethyl chloride used over injection site  I next used a sterile 3 mL syringe with 25-gauge needle with 1 mL 1% lidocaine plain, 1 mL 1% betamethasone and 1 mL dexamethasone injected in standard medial approach right plantar fascia. Patient tolerated corticosteroid injection well. Band-Aid applied. The patient was educated on a possible steroid flare and the use of ice with frozen water bottle 10 minutes each night discussed. Continue passive and active range of motion stretches and strengthening bilateral plantar fascia and Achilles tendons. If recurrent symptoms we did discuss Cam walking boot versus formal physical therapy at follow-up. He will call for follow-up appointment. Return if symptoms worsen or fail to improve. Seen By:  Lisseth Morgan DPM      Document was created using voice recognition software. Note was reviewed, however may contain grammatical errors.

## 2022-07-31 DIAGNOSIS — F41.9 ANXIETY: ICD-10-CM

## 2022-08-01 RX ORDER — FLUOXETINE HYDROCHLORIDE 20 MG/1
CAPSULE ORAL
Qty: 90 CAPSULE | Refills: 1 | Status: SHIPPED | OUTPATIENT
Start: 2022-08-01

## 2022-09-20 ENCOUNTER — OFFICE VISIT (OUTPATIENT)
Dept: ENT CLINIC | Age: 37
End: 2022-09-20
Payer: COMMERCIAL

## 2022-09-20 VITALS — BODY MASS INDEX: 29.57 KG/M2 | HEIGHT: 66 IN | WEIGHT: 184 LBS

## 2022-09-20 DIAGNOSIS — R09.81 NASAL CONGESTION: ICD-10-CM

## 2022-09-20 DIAGNOSIS — J30.89 SEASONAL ALLERGIC RHINITIS DUE TO OTHER ALLERGIC TRIGGER: ICD-10-CM

## 2022-09-20 DIAGNOSIS — Z98.890 S/P FESS (FUNCTIONAL ENDOSCOPIC SINUS SURGERY): Primary | ICD-10-CM

## 2022-09-20 PROCEDURE — 99213 OFFICE O/P EST LOW 20 MIN: CPT | Performed by: OTOLARYNGOLOGY

## 2022-09-20 ASSESSMENT — ENCOUNTER SYMPTOMS
EYE PAIN: 0
COLOR CHANGE: 0
SINUS PRESSURE: 0
ABDOMINAL PAIN: 0
SHORTNESS OF BREATH: 0
ABDOMINAL DISTENTION: 0
CHEST TIGHTNESS: 0
SINUS PAIN: 0
TROUBLE SWALLOWING: 0
PHOTOPHOBIA: 0

## 2022-09-20 NOTE — PROGRESS NOTES
Subjective:      Patient ID:  Geeta Schaefer is a 40 y.o. male. HPI:    Pt is returning from FESS surgery 4 month(s) ago. PT is  doing well. PT is  breathing better. Pt is  using Flonase and singulaire      Past Medical History:   Diagnosis Date    Allergic rhinitis     Anxiety     Chronic sinusitis     COVID-19 08/2022    Lactose intolerance     MONALISA on CPAP      Past Surgical History:   Procedure Laterality Date    ANTERIOR CRUCIATE LIGAMENT REPAIR      COLONOSCOPY      ECHOCARDIOGRAM EXERCISE STRESS TEST  11/06/2013         SINUS ENDOSCOPY Bilateral 5/9/2022    FUNCTIONAL ENDOSCOPIC SINUS SURGERY SUBMUCOSAL RESECTION INFERIOR TURBINATES performed by Katie Dave DO at [facility] OR    TONSILLECTOMY       Family History   Problem Relation Age of Onset    Diabetes Mother     Other Father         pituitary tumor    Ovarian Cancer Maternal Grandmother     Cancer Maternal Grandfather     Lung Cancer Paternal Grandfather      Social History     Socioeconomic History    Marital status:      Spouse name: None    Number of children: None    Years of education: None    Highest education level: None   Occupational History    Occupation: [employer] treatment   Tobacco Use    Smoking status: Never    Smokeless tobacco: Never   Vaping Use    Vaping Use: Never used   Substance and Sexual Activity    Alcohol use: Yes     Comment: 2-3 beers per day    Drug use: No     Social Determinants of Health     Financial Resource Strain: Low Risk     Difficulty of Paying Living Expenses: Not hard at all   Food Insecurity: No Food Insecurity    Worried About Running Out of Food in the Last Year: Never true    [address] in the Last Year: Never true     Allergies   Allergen Reactions    Bee Venom     Cephalexin Diarrhea    Cephalosporins     Morphine      Violent         Review of Systems   Constitutional:  Negative for chills and fever. HENT:  Positive for congestion.  Negative for ear discharge, ear pain, sinus pressure, sinus pain, tinnitus and trouble swallowing. Eyes:  Negative for photophobia, pain and visual disturbance. Respiratory:  Negative for chest tightness and shortness of breath. Cardiovascular:  Negative for chest pain and palpitations. Gastrointestinal:  Negative for abdominal distention and abdominal pain. Endocrine: Negative for cold intolerance and heat intolerance. Skin:  Negative for color change and pallor. Neurological:  Negative for dizziness and facial asymmetry. Psychiatric/Behavioral:  Negative for behavioral problems and confusion. All other systems reviewed and are negative. Objective: There were no vitals filed for this visit. Physical Exam  Constitutional:       Appearance: He is well-developed. HENT:      Head: Normocephalic and atraumatic. Right Ear: Tympanic membrane, ear canal and external ear normal.      Left Ear: Tympanic membrane, ear canal and external ear normal.      Nose: Nose normal.      Mouth/Throat:      Pharynx: Uvula midline. Eyes:      Pupils: Pupils are equal, round, and reactive to light. Cardiovascular:      Rate and Rhythm: Normal rate. Heart sounds: Normal heart sounds. Pulmonary:      Effort: Pulmonary effort is normal.      Breath sounds: Normal breath sounds. Abdominal:      General: Bowel sounds are normal. There is no distension. Palpations: Abdomen is soft. Musculoskeletal:      Cervical back: Normal range of motion and neck supple. Skin:     General: Skin is warm and dry. Neurological:      Mental Status: He is alert and oriented to person, place, and time. Psychiatric:         Behavior: Behavior normal.               Assessment:       Diagnosis Orders   1. S/P FESS (functional endoscopic sinus surgery)        2. Seasonal allergic rhinitis due to other allergic trigger        3.  Nasal congestion                   Plan:      Allergic rhinitis  I do want to continue fluticasone (Flonase) Astelin for now. Call or return to clinic prn if these symptoms worsen or fail to improve as anticipated.     Follow up in 6 month(s)

## 2022-09-21 DIAGNOSIS — J30.89 NON-SEASONAL ALLERGIC RHINITIS, UNSPECIFIED TRIGGER: ICD-10-CM

## 2022-09-21 RX ORDER — FLUTICASONE PROPIONATE 50 MCG
SPRAY, SUSPENSION (ML) NASAL
Qty: 16 G | Refills: 5 | Status: SHIPPED | OUTPATIENT
Start: 2022-09-21

## 2022-09-22 RX ORDER — MELOXICAM 15 MG/1
TABLET ORAL
Qty: 30 TABLET | Refills: 1 | Status: SHIPPED | OUTPATIENT
Start: 2022-09-22

## 2022-11-21 RX ORDER — MELOXICAM 15 MG/1
TABLET ORAL
Qty: 30 TABLET | Refills: 1 | Status: SHIPPED | OUTPATIENT
Start: 2022-11-21

## 2022-12-14 DIAGNOSIS — J30.89 NON-SEASONAL ALLERGIC RHINITIS, UNSPECIFIED TRIGGER: ICD-10-CM

## 2022-12-14 RX ORDER — FLUTICASONE PROPIONATE 50 MCG
2 SPRAY, SUSPENSION (ML) NASAL DAILY
Qty: 16 G | Refills: 5 | Status: SHIPPED | OUTPATIENT
Start: 2022-12-14

## 2022-12-18 ENCOUNTER — PATIENT MESSAGE (OUTPATIENT)
Dept: ENT CLINIC | Age: 37
End: 2022-12-18

## 2022-12-18 DIAGNOSIS — J30.89 SEASONAL ALLERGIC RHINITIS DUE TO OTHER ALLERGIC TRIGGER: Primary | ICD-10-CM

## 2022-12-19 NOTE — TELEPHONE ENCOUNTER
From: Josie Roberts  To: Dr. Zak Yoon  Sent: 12/18/2022 10:30 PM EST  Subject: Refill    Could I get a refill on the azelastine nasal spray?   If any questions please call [phone]

## 2022-12-20 RX ORDER — AZELASTINE 1 MG/ML
2 SPRAY, METERED NASAL 2 TIMES DAILY
Qty: 1 EACH | Refills: 5 | Status: SHIPPED | OUTPATIENT
Start: 2022-12-20

## 2023-01-26 RX ORDER — MELOXICAM 15 MG/1
TABLET ORAL
Qty: 30 TABLET | Refills: 1 | Status: SHIPPED | OUTPATIENT
Start: 2023-01-26

## 2023-03-21 ENCOUNTER — OFFICE VISIT (OUTPATIENT)
Dept: ENT CLINIC | Age: 38
End: 2023-03-21
Payer: COMMERCIAL

## 2023-03-21 VITALS
TEMPERATURE: 99.5 F | DIASTOLIC BLOOD PRESSURE: 92 MMHG | RESPIRATION RATE: 12 BRPM | SYSTOLIC BLOOD PRESSURE: 134 MMHG | WEIGHT: 180 LBS | HEART RATE: 80 BPM | HEIGHT: 67 IN | BODY MASS INDEX: 28.25 KG/M2 | OXYGEN SATURATION: 96 %

## 2023-03-21 DIAGNOSIS — Z98.890 S/P FESS (FUNCTIONAL ENDOSCOPIC SINUS SURGERY): Primary | ICD-10-CM

## 2023-03-21 DIAGNOSIS — J30.89 NON-SEASONAL ALLERGIC RHINITIS, UNSPECIFIED TRIGGER: ICD-10-CM

## 2023-03-21 DIAGNOSIS — J33.8 MAXILLARY POLYP OF SINUS: ICD-10-CM

## 2023-03-21 DIAGNOSIS — J30.89 SEASONAL ALLERGIC RHINITIS DUE TO OTHER ALLERGIC TRIGGER: ICD-10-CM

## 2023-03-21 PROCEDURE — 99214 OFFICE O/P EST MOD 30 MIN: CPT | Performed by: OTOLARYNGOLOGY

## 2023-03-21 RX ORDER — GABAPENTIN 100 MG/1
100 CAPSULE ORAL 3 TIMES DAILY
COMMUNITY
Start: 2023-01-31

## 2023-03-21 RX ORDER — AZELASTINE 1 MG/ML
2 SPRAY, METERED NASAL 2 TIMES DAILY
Qty: 1 EACH | Refills: 5 | Status: SHIPPED | OUTPATIENT
Start: 2023-03-21

## 2023-03-21 RX ORDER — FLUTICASONE PROPIONATE 50 MCG
2 SPRAY, SUSPENSION (ML) NASAL DAILY
Qty: 16 G | Refills: 5 | Status: SHIPPED | OUTPATIENT
Start: 2023-03-21

## 2023-03-21 RX ORDER — VENLAFAXINE HYDROCHLORIDE 37.5 MG/1
37.5 CAPSULE, EXTENDED RELEASE ORAL EVERY MORNING
COMMUNITY
Start: 2023-02-21

## 2023-03-21 ASSESSMENT — ENCOUNTER SYMPTOMS
PHOTOPHOBIA: 0
SINUS PRESSURE: 0
COLOR CHANGE: 0
SINUS PAIN: 0
SHORTNESS OF BREATH: 0
TROUBLE SWALLOWING: 0
ABDOMINAL PAIN: 0
EYE PAIN: 0
ABDOMINAL DISTENTION: 0
CHEST TIGHTNESS: 0

## 2023-03-21 NOTE — PROGRESS NOTES
Subjective:      Patient ID:  Nancy Jimenez is a 41 y.o. male. HPI:    Pt is returning from FESS surgery 4 month(s) ago. PT is  doing well. PT is  breathing better. 2 sinus infections since last visit  Pt is  using Flonase and singulaire      Past Medical History:   Diagnosis Date    Allergic rhinitis     Anxiety     Chronic sinusitis     COVID-19 08/2022    Lactose intolerance     MONALISA on CPAP      Past Surgical History:   Procedure Laterality Date    ANTERIOR CRUCIATE LIGAMENT REPAIR      COLONOSCOPY      ECHOCARDIOGRAM EXERCISE STRESS TEST  11/06/2013         SINUS ENDOSCOPY Bilateral 5/9/2022    FUNCTIONAL ENDOSCOPIC SINUS SURGERY SUBMUCOSAL RESECTION INFERIOR TURBINATES performed by Sandra Ordonez DO at [facility] OR    TONSILLECTOMY       Family History   Problem Relation Age of Onset    Diabetes Mother     Other Father         pituitary tumor    Ovarian Cancer Maternal Grandmother     Cancer Maternal Grandfather     Lung Cancer Paternal Grandfather      Social History     Socioeconomic History    Marital status:      Spouse name: None    Number of children: None    Years of education: None    Highest education level: None   Occupational History    Occupation: [employer] treatment   Tobacco Use    Smoking status: Never    Smokeless tobacco: Former     Types: Chew     Quit date: 2017   Vaping Use    Vaping Use: Never used   Substance and Sexual Activity    Alcohol use: Yes     Comment: 2-3 beers per day    Drug use: No     Allergies   Allergen Reactions    Bee Venom     Cephalexin Diarrhea    Cephalosporins     Morphine      Violent         Review of Systems   Constitutional:  Negative for chills and fever. HENT:  Positive for congestion. Negative for ear discharge, ear pain, sinus pressure, sinus pain, tinnitus and trouble swallowing. Eyes:  Negative for photophobia, pain and visual disturbance. Respiratory:  Negative for chest tightness and shortness of breath.     Cardiovascular:

## 2023-03-29 RX ORDER — MELOXICAM 15 MG/1
TABLET ORAL
Qty: 30 TABLET | Refills: 1 | OUTPATIENT
Start: 2023-03-29

## 2023-08-26 DIAGNOSIS — J30.89 NON-SEASONAL ALLERGIC RHINITIS, UNSPECIFIED TRIGGER: ICD-10-CM

## 2023-08-26 DIAGNOSIS — J30.89 SEASONAL ALLERGIC RHINITIS DUE TO OTHER ALLERGIC TRIGGER: Primary | ICD-10-CM

## 2023-08-29 RX ORDER — FLUTICASONE PROPIONATE 50 MCG
SPRAY, SUSPENSION (ML) NASAL
Qty: 16 G | Refills: 5 | Status: SHIPPED | OUTPATIENT
Start: 2023-08-29

## 2023-08-29 RX ORDER — AZELASTINE HYDROCHLORIDE 137 UG/1
SPRAY, METERED NASAL
Qty: 30 ML | Refills: 3 | Status: SHIPPED | OUTPATIENT
Start: 2023-08-29

## 2023-09-13 ENCOUNTER — HOSPITAL ENCOUNTER (EMERGENCY)
Age: 38
Discharge: HOME | End: 2023-09-13
Attending: STUDENT IN AN ORGANIZED HEALTH CARE EDUCATION/TRAINING PROGRAM
Payer: COMMERCIAL

## 2023-09-13 ENCOUNTER — APPOINTMENT (OUTPATIENT)
Dept: CT IMAGING | Age: 38
End: 2023-09-13
Payer: COMMERCIAL

## 2023-09-13 VITALS
HEIGHT: 67 IN | RESPIRATION RATE: 16 BRPM | TEMPERATURE: 97.1 F | HEART RATE: 82 BPM | OXYGEN SATURATION: 98 % | WEIGHT: 180 LBS | DIASTOLIC BLOOD PRESSURE: 97 MMHG | SYSTOLIC BLOOD PRESSURE: 147 MMHG | BODY MASS INDEX: 28.25 KG/M2

## 2023-09-13 DIAGNOSIS — H57.02 ANISOCORIA: Primary | ICD-10-CM

## 2023-09-13 DIAGNOSIS — T88.7XXA MEDICATION SIDE EFFECT: ICD-10-CM

## 2023-09-13 LAB
ALBUMIN SERPL-MCNC: 4.5 G/DL (ref 3.5–5.2)
ALP SERPL-CCNC: 51 U/L (ref 40–129)
ALT SERPL-CCNC: 31 U/L (ref 0–40)
ANION GAP SERPL CALCULATED.3IONS-SCNC: 10 MMOL/L (ref 7–16)
AST SERPL-CCNC: 22 U/L (ref 0–39)
BASOPHILS # BLD: 0.05 K/UL (ref 0–0.2)
BASOPHILS NFR BLD: 1 % (ref 0–2)
BILIRUB SERPL-MCNC: 0.7 MG/DL (ref 0–1.2)
BUN SERPL-MCNC: 24 MG/DL (ref 6–20)
CALCIUM SERPL-MCNC: 9.5 MG/DL (ref 8.6–10.2)
CHLORIDE SERPL-SCNC: 101 MMOL/L (ref 98–107)
CO2 SERPL-SCNC: 29 MMOL/L (ref 22–29)
CREAT SERPL-MCNC: 1.1 MG/DL (ref 0.7–1.2)
EOSINOPHIL # BLD: 0.19 K/UL (ref 0.05–0.5)
EOSINOPHILS RELATIVE PERCENT: 3 % (ref 0–6)
ERYTHROCYTE [DISTWIDTH] IN BLOOD BY AUTOMATED COUNT: 12.5 % (ref 11.5–15)
GFR SERPL CREATININE-BSD FRML MDRD: >60 ML/MIN/1.73M2
GLUCOSE SERPL-MCNC: 92 MG/DL (ref 74–99)
HCT VFR BLD AUTO: 42.2 % (ref 37–54)
HGB BLD-MCNC: 13.6 G/DL (ref 12.5–16.5)
IMM GRANULOCYTES # BLD AUTO: <0.03 K/UL (ref 0–0.58)
IMM GRANULOCYTES NFR BLD: 0 % (ref 0–5)
LYMPHOCYTES NFR BLD: 1.98 K/UL (ref 1.5–4)
LYMPHOCYTES RELATIVE PERCENT: 30 % (ref 20–42)
MCH RBC QN AUTO: 30.6 PG (ref 26–35)
MCHC RBC AUTO-ENTMCNC: 32.2 G/DL (ref 32–34.5)
MCV RBC AUTO: 95 FL (ref 80–99.9)
MONOCYTES NFR BLD: 0.51 K/UL (ref 0.1–0.95)
MONOCYTES NFR BLD: 8 % (ref 2–12)
NEUTROPHILS NFR BLD: 59 % (ref 43–80)
NEUTS SEG NFR BLD: 3.92 K/UL (ref 1.8–7.3)
PLATELET # BLD AUTO: 198 K/UL (ref 130–450)
PMV BLD AUTO: 10.4 FL (ref 7–12)
POTASSIUM SERPL-SCNC: 4 MMOL/L (ref 3.5–5)
PROT SERPL-MCNC: 7 G/DL (ref 6.4–8.3)
RBC # BLD AUTO: 4.44 M/UL (ref 3.8–5.8)
SODIUM SERPL-SCNC: 140 MMOL/L (ref 132–146)
TROPONIN I SERPL HS-MCNC: <6 NG/L (ref 0–11)
WBC OTHER # BLD: 6.7 K/UL (ref 4.5–11.5)

## 2023-09-13 PROCEDURE — 84484 ASSAY OF TROPONIN QUANT: CPT

## 2023-09-13 PROCEDURE — 70498 CT ANGIOGRAPHY NECK: CPT

## 2023-09-13 PROCEDURE — 70496 CT ANGIOGRAPHY HEAD: CPT

## 2023-09-13 PROCEDURE — 85025 COMPLETE CBC W/AUTO DIFF WBC: CPT

## 2023-09-13 PROCEDURE — 93005 ELECTROCARDIOGRAM TRACING: CPT | Performed by: STUDENT IN AN ORGANIZED HEALTH CARE EDUCATION/TRAINING PROGRAM

## 2023-09-13 PROCEDURE — 80053 COMPREHEN METABOLIC PANEL: CPT

## 2023-09-13 PROCEDURE — 6370000000 HC RX 637 (ALT 250 FOR IP)

## 2023-09-13 PROCEDURE — 6360000004 HC RX CONTRAST MEDICATION: Performed by: RADIOLOGY

## 2023-09-13 PROCEDURE — 99285 EMERGENCY DEPT VISIT HI MDM: CPT

## 2023-09-13 RX ORDER — TETRACAINE HYDROCHLORIDE 5 MG/ML
1 SOLUTION OPHTHALMIC ONCE
Status: COMPLETED | OUTPATIENT
Start: 2023-09-13 | End: 2023-09-13

## 2023-09-13 RX ADMIN — IOPAMIDOL 75 ML: 755 INJECTION, SOLUTION INTRAVENOUS at 19:16

## 2023-09-13 RX ADMIN — FLUORESCEIN SODIUM 1 STRIP: 1 STRIP OPHTHALMIC at 20:37

## 2023-09-13 RX ADMIN — TETRACAINE HYDROCHLORIDE 1 DROP: 5 SOLUTION OPHTHALMIC at 20:39

## 2023-09-13 RX ADMIN — FLUORESCEIN SODIUM 1 MG: 1 STRIP OPHTHALMIC at 20:38

## 2023-09-13 NOTE — ED NOTES
Department of Emergency Medicine  FIRST PROVIDER TRIAGE NOTE             Independent MLP           9/13/23  5:45 PM EDT    Date of Encounter: 9/13/23   MRN: [id number]      HPI: Richard Adkins is a 41 y.o. male who presents to the ED for Loss of Vision (R eye vision loss at 1700 R pupil dilated) and Dizziness   BLURRY VISION TO RIGHT EYE. RIGHT EYE HAS BEEN DILATED AND HE IS DIZZY. ROS: Negative for cp or sob. PE: Gen Appearance/Constitutional: alert  HEENT: NC/NT. PERRLA,  Airway patent. Initial Plan of Care: All treatment areas with department are currently occupied. Plan to order/Initiate the following while awaiting opening in ED: imaging studies.   Initiate Treatment-Testing, Proceed toTreatment Area When Bed Available for ED Attending/MLP to Continue Care    Electronically signed by NAKITA Salmeron CNP   DD: 9/13/23      NAKITA Salmeron CNP  09/13/23 [id number]

## 2023-09-13 NOTE — ED NOTES
Visual acuity: Bilateral eyes 20/15, LE 20/15, RE 20/50 wears glasses but does not have them at this time.      Kamilah Galarza RN  09/13/23 [id number]

## 2023-09-14 LAB
EKG ATRIAL RATE: 71 BPM
EKG P AXIS: 14 DEGREES
EKG P-R INTERVAL: 126 MS
EKG Q-T INTERVAL: 366 MS
EKG QRS DURATION: 106 MS
EKG QTC CALCULATION (BAZETT): 397 MS
EKG R AXIS: -8 DEGREES
EKG T AXIS: 30 DEGREES
EKG VENTRICULAR RATE: 71 BPM

## 2023-09-14 PROCEDURE — 93010 ELECTROCARDIOGRAM REPORT: CPT | Performed by: INTERNAL MEDICINE

## 2023-09-14 ASSESSMENT — ENCOUNTER SYMPTOMS
DIARRHEA: 0
BACK PAIN: 0
COUGH: 0
NAUSEA: 0
ABDOMINAL PAIN: 0
SHORTNESS OF BREATH: 0
VOMITING: 0
WHEEZING: 0
TROUBLE SWALLOWING: 0
VOICE CHANGE: 0

## 2023-09-14 ASSESSMENT — TONOMETRY
OS_IOP_MMHG: 17
OD_IOP_MMHG: 18

## 2023-09-14 ASSESSMENT — VISUAL ACUITY: OU: 1

## 2023-09-14 NOTE — DISCHARGE INSTRUCTIONS
Return to ED if any worsening symptoms or alarming changes occur. Follow-up with ophthalmology for further evaluation and for monitoring for resolution of symptomatology.

## 2023-12-25 DIAGNOSIS — J30.89 SEASONAL ALLERGIC RHINITIS DUE TO OTHER ALLERGIC TRIGGER: ICD-10-CM

## 2023-12-26 RX ORDER — AZELASTINE HYDROCHLORIDE 137 UG/1
SPRAY, METERED NASAL
Qty: 30 ML | Refills: 5 | Status: SHIPPED | OUTPATIENT
Start: 2023-12-26

## 2023-12-26 NOTE — TELEPHONE ENCOUNTER
Patient requesting refill of Astelin. LOV 3/23.      Electronically signed by Misti Uribe MA on 12/26/23 at 8:28 AM EST

## 2024-02-19 DIAGNOSIS — J30.89 SEASONAL ALLERGIC RHINITIS DUE TO OTHER ALLERGIC TRIGGER: Primary | ICD-10-CM

## 2024-02-19 RX ORDER — FLUTICASONE PROPIONATE 50 MCG
SPRAY, SUSPENSION (ML) NASAL
Qty: 16 G | Refills: 5 | Status: SHIPPED | OUTPATIENT
Start: 2024-02-19

## 2024-03-26 ENCOUNTER — OFFICE VISIT (OUTPATIENT)
Dept: ENT CLINIC | Age: 39
End: 2024-03-26
Payer: COMMERCIAL

## 2024-03-26 VITALS — WEIGHT: 180 LBS | HEIGHT: 67 IN | BODY MASS INDEX: 28.25 KG/M2

## 2024-03-26 DIAGNOSIS — J30.89 SEASONAL ALLERGIC RHINITIS DUE TO OTHER ALLERGIC TRIGGER: Primary | ICD-10-CM

## 2024-03-26 PROCEDURE — 99213 OFFICE O/P EST LOW 20 MIN: CPT | Performed by: OTOLARYNGOLOGY

## 2024-03-26 RX ORDER — CETIRIZINE HYDROCHLORIDE 10 MG/1
TABLET ORAL
COMMUNITY
Start: 2022-10-31

## 2024-03-26 ASSESSMENT — ENCOUNTER SYMPTOMS
TROUBLE SWALLOWING: 0
PHOTOPHOBIA: 0
SINUS PAIN: 0
ABDOMINAL PAIN: 0
ABDOMINAL DISTENTION: 0
SINUS PRESSURE: 0
COLOR CHANGE: 0
CHEST TIGHTNESS: 0
SHORTNESS OF BREATH: 0
EYE PAIN: 0

## 2024-03-26 NOTE — PROGRESS NOTES
Transportation Needs: No Transportation Needs (7/23/2020)    PRAPARE - Transportation     Lack of Transportation (Medical): No     Lack of Transportation (Non-Medical): No     Allergies   Allergen Reactions    Bee Venom     Cephalexin Diarrhea    Cephalosporins     Morphine      Violent         Review of Systems   Constitutional:  Negative for chills and fever.   HENT:  Positive for congestion and postnasal drip. Negative for ear discharge, ear pain, nosebleeds, sinus pressure, sinus pain, tinnitus and trouble swallowing.    Eyes:  Negative for photophobia, pain and visual disturbance.   Respiratory:  Negative for chest tightness and shortness of breath.    Cardiovascular:  Negative for chest pain and palpitations.   Gastrointestinal:  Negative for abdominal distention and abdominal pain.   Endocrine: Negative for cold intolerance and heat intolerance.   Skin:  Negative for color change and pallor.   Neurological:  Negative for dizziness and facial asymmetry.   Psychiatric/Behavioral:  Negative for behavioral problems and confusion.    All other systems reviewed and are negative.              Objective:     There were no vitals filed for this visit.    Physical Exam  Constitutional:       Appearance: He is well-developed.   HENT:      Head: Normocephalic and atraumatic.      Right Ear: Tympanic membrane, ear canal and external ear normal.      Left Ear: Tympanic membrane, ear canal and external ear normal.      Nose: Nose normal.      Mouth/Throat:      Pharynx: Uvula midline.   Eyes:      Pupils: Pupils are equal, round, and reactive to light.   Cardiovascular:      Rate and Rhythm: Normal rate.      Heart sounds: Normal heart sounds.   Pulmonary:      Effort: Pulmonary effort is normal.      Breath sounds: Normal breath sounds.   Abdominal:      General: Bowel sounds are normal. There is no distension.      Palpations: Abdomen is soft.   Musculoskeletal:      Cervical back: Normal range of motion and neck

## 2024-04-16 ENCOUNTER — HOSPITAL ENCOUNTER (OUTPATIENT)
Age: 39
Discharge: HOME | End: 2024-04-18

## 2024-04-19 LAB — SURGICAL PATHOLOGY REPORT: NORMAL

## 2024-06-06 DIAGNOSIS — J30.89 SEASONAL ALLERGIC RHINITIS DUE TO OTHER ALLERGIC TRIGGER: ICD-10-CM

## 2024-06-06 RX ORDER — AZELASTINE HYDROCHLORIDE 137 UG/1
SPRAY, METERED NASAL
Qty: 30 ML | Refills: 5 | Status: SHIPPED | OUTPATIENT
Start: 2024-06-06

## 2024-08-13 DIAGNOSIS — J30.89 SEASONAL ALLERGIC RHINITIS DUE TO OTHER ALLERGIC TRIGGER: Primary | ICD-10-CM

## 2024-08-14 RX ORDER — FLUTICASONE PROPIONATE 50 MCG
2 SPRAY, SUSPENSION (ML) NASAL DAILY
Qty: 16 G | Refills: 3 | Status: SHIPPED | OUTPATIENT
Start: 2024-08-14

## 2025-01-08 DIAGNOSIS — J30.89 SEASONAL ALLERGIC RHINITIS DUE TO OTHER ALLERGIC TRIGGER: Primary | ICD-10-CM

## 2025-01-09 RX ORDER — FLUTICASONE PROPIONATE 50 MCG
2 SPRAY, SUSPENSION (ML) NASAL DAILY
Qty: 16 G | Refills: 3 | Status: SHIPPED | OUTPATIENT
Start: 2025-01-09

## 2025-01-09 NOTE — TELEPHONE ENCOUNTER
Patient was last seen in office 3/26/24 patient would like a prescription refill for flonase. Next office visit 3/26/25.

## 2025-02-12 DIAGNOSIS — J30.89 SEASONAL ALLERGIC RHINITIS DUE TO OTHER ALLERGIC TRIGGER: ICD-10-CM

## 2025-02-13 RX ORDER — AZELASTINE HYDROCHLORIDE 137 UG/1
SPRAY, METERED NASAL
Qty: 30 ML | Refills: 5 | Status: SHIPPED | OUTPATIENT
Start: 2025-02-13

## 2025-03-26 ENCOUNTER — OFFICE VISIT (OUTPATIENT)
Dept: ENT CLINIC | Age: 40
End: 2025-03-26
Payer: COMMERCIAL

## 2025-03-26 VITALS
OXYGEN SATURATION: 94 % | DIASTOLIC BLOOD PRESSURE: 78 MMHG | WEIGHT: 179.9 LBS | SYSTOLIC BLOOD PRESSURE: 120 MMHG | HEART RATE: 98 BPM | RESPIRATION RATE: 18 BRPM | HEIGHT: 67 IN | TEMPERATURE: 99.5 F | BODY MASS INDEX: 28.24 KG/M2

## 2025-03-26 DIAGNOSIS — J30.89 NON-SEASONAL ALLERGIC RHINITIS, UNSPECIFIED TRIGGER: ICD-10-CM

## 2025-03-26 DIAGNOSIS — J30.89 SEASONAL ALLERGIC RHINITIS DUE TO OTHER ALLERGIC TRIGGER: Primary | ICD-10-CM

## 2025-03-26 PROCEDURE — 99213 OFFICE O/P EST LOW 20 MIN: CPT | Performed by: OTOLARYNGOLOGY

## 2025-03-26 RX ORDER — LISDEXAMFETAMINE DIMESYLATE 30 MG/1
CAPSULE ORAL
COMMUNITY
Start: 2025-01-10

## 2025-03-26 ASSESSMENT — ENCOUNTER SYMPTOMS
CHEST TIGHTNESS: 0
PHOTOPHOBIA: 0
COLOR CHANGE: 0
ABDOMINAL PAIN: 0
SINUS PAIN: 0
TROUBLE SWALLOWING: 0
ABDOMINAL DISTENTION: 0
EYE PAIN: 0
SHORTNESS OF BREATH: 0
SINUS PRESSURE: 0

## 2025-03-26 NOTE — PROGRESS NOTES
Subjective:      Patient ID:  Mohit Mcmillan is a 40 y.o. male.    HPI:    1 year follow up for allergies  S/P  FESS surgery 9/2022   PT is  doing well.  PT is  breathing better.  Reports improvement in sinus congestion.  No sinus infections  Pt is  using Flonase, Astelin and and Singulair  Uses Zyrtec PRN  Sxs worse in spring and summer      Past Medical History:   Diagnosis Date    Allergic rhinitis     Anxiety     Chronic sinusitis     COVID-19 08/2022    Lactose intolerance     MONALISA on CPAP      Past Surgical History:   Procedure Laterality Date    ANTERIOR CRUCIATE LIGAMENT REPAIR      COLONOSCOPY      ECHOCARDIOGRAM EXERCISE STRESS TEST  11/06/2013         SINUS ENDOSCOPY Bilateral 5/9/2022    FUNCTIONAL ENDOSCOPIC SINUS SURGERY SUBMUCOSAL RESECTION INFERIOR TURBINATES performed by Oscar Hewitt DO at [facility] OR    TONSILLECTOMY       Family History   Problem Relation Age of Onset    Diabetes Mother     Other Father         pituitary tumor    Ovarian Cancer Maternal Grandmother     Cancer Maternal Grandfather     Lung Cancer Paternal Grandfather      Social History     Socioeconomic History    Marital status:      Spouse name: None    Number of children: None    Years of education: None    Highest education level: None   Occupational History    Occupation: [employer] treatment   Tobacco Use    Smoking status: Never    Smokeless tobacco: Former     Types: Chew     Quit date: 2017   Vaping Use    Vaping status: Never Used   Substance and Sexual Activity    Alcohol use: Yes     Comment: 2-3 beers per day    Drug use: No     Social Drivers of Health     Financial Resource Strain: Low Risk  (9/27/2021)    Overall Financial Resource Strain (CARDIA)     Difficulty of Paying Living Expenses: Not hard at all   Food Insecurity: No Food Insecurity (9/27/2021)    Hunger Vital Sign     Worried About Running Out of Food in the Last Year: Never true     Ran Out of Food in the Last Year: Never true

## 2025-06-03 DIAGNOSIS — J30.89 SEASONAL ALLERGIC RHINITIS DUE TO OTHER ALLERGIC TRIGGER: Primary | ICD-10-CM

## 2025-06-03 RX ORDER — FLUTICASONE PROPIONATE 50 MCG
2 SPRAY, SUSPENSION (ML) NASAL DAILY
Qty: 16 G | Refills: 0 | Status: SHIPPED | OUTPATIENT
Start: 2025-06-03

## (undated) DEVICE — BALLOON SINUPLASTY 6X16 MM SYS RELIEVA SPINPLUS

## (undated) DEVICE — SPLINT 1524055 DOYLE II AIRWAY SET: Brand: DOYLE II ™

## (undated) DEVICE — TRAY SINUS INST EXTRAS REUSABLE

## (undated) DEVICE — TOWEL,OR,DSP,ST,BLUE,STD,6/PK,12PK/CS: Brand: MEDLINE

## (undated) DEVICE — Device

## (undated) DEVICE — MARKER,SKIN,WI/RULER AND LABELS: Brand: MEDLINE

## (undated) DEVICE — KIT,ANTI FOG,W/SPONGE & FLUID,SOFT PACK: Brand: MEDLINE

## (undated) DEVICE — INSTRUMENT CORD BALLOON SINUPLASTY REUSABLE

## (undated) DEVICE — BLADE 1884380HR QUADCUT 5PK 4.3MM X 13CM: Brand: QUADCUT®

## (undated) DEVICE — SET SINUS ENDOSCOPY

## (undated) DEVICE — 4-PORT MANIFOLD: Brand: NEPTUNE 2

## (undated) DEVICE — SOLUTION IV 500ML 0.9% SOD CHL PH 5 INJ USP VIAFLX PLAS

## (undated) DEVICE — SINU FOAM: Brand: SINU-FOAM

## (undated) DEVICE — SYRINGE MED 50ML LUERLOCK TIP

## (undated) DEVICE — GLOVE ORANGE PI 8 1/2   MSG9085

## (undated) DEVICE — CAMERA STRYKER 1488

## (undated) DEVICE — INTENDED FOR TISSUE SEPARATION, AND OTHER PROCEDURES THAT REQUIRE A SHARP SURGICAL BLADE TO PUNCTURE OR CUT.: Brand: BARD-PARKER ® STAINLESS STEEL BLADES

## (undated) DEVICE — TUBING, SUCTION, 3/16" X 12', STRAIGHT: Brand: MEDLINE

## (undated) DEVICE — CONTROL SYRINGE LUER-LOCK TIP: Brand: MONOJECT

## (undated) DEVICE — LIGHT SOURCE WHT

## (undated) DEVICE — NEEDLE FLTR 18GA L1.5IN MEM THK5UM BLNT DISP

## (undated) DEVICE — SET NASAL

## (undated) DEVICE — SYRINGE, LUER LOCK, 10ML: Brand: MEDLINE

## (undated) DEVICE — SHEATH 1912010 5PK 4MM/30DEG STORZ XOMED: Brand: ENDO-SCRUB®

## (undated) DEVICE — TUBING 1912030 ENDO-SCRUB 2 5PK: Brand: ENDO-SCRUB®

## (undated) DEVICE — SET SINUS SCOPE

## (undated) DEVICE — SURGICAL PROCEDURE PACK EENT CUST

## (undated) DEVICE — COUNTER NDL 30 COUNT DBL MAG

## (undated) DEVICE — SET MAGNUM STR SHOT

## (undated) DEVICE — NEEDLE HYPO 25GA L1.5IN BLU POLYPR HUB S STL REG BVL STR

## (undated) DEVICE — MAGNETIC INSTR DRAPE 20X16: Brand: MEDLINE INDUSTRIES, INC.

## (undated) DEVICE — SPONGE GZ W4XL4IN RAYON POLY FILL CVR W/ NONWOVEN FAB

## (undated) DEVICE — DOUBLE BASIN SET: Brand: MEDLINE INDUSTRIES, INC.

## (undated) DEVICE — DEVICE INFL PRSS G INDIC DISP (MUST BE PURC IN MULTIPLES OF 5)

## (undated) DEVICE — SOLUTION IV 1000ML 0.9% SOD CHL PH 5 INJ USP VIAFLX PLAS

## (undated) DEVICE — CODMAN® SURGICAL PATTIES 1/2" X 3" (1.27CM X 7.62CM): Brand: CODMAN®

## (undated) DEVICE — NEEDLE SPNL 22GA L3.5IN BLK HUB S STL REG WALL FIT STYL W/